# Patient Record
Sex: MALE | Race: BLACK OR AFRICAN AMERICAN | NOT HISPANIC OR LATINO | Employment: FULL TIME | URBAN - METROPOLITAN AREA
[De-identification: names, ages, dates, MRNs, and addresses within clinical notes are randomized per-mention and may not be internally consistent; named-entity substitution may affect disease eponyms.]

---

## 2020-03-27 ENCOUNTER — TELEPHONE (OUTPATIENT)
Dept: ORTHOPEDICS | Facility: CLINIC | Age: 40
End: 2020-03-27

## 2020-03-27 ENCOUNTER — OFFICE VISIT (OUTPATIENT)
Dept: ORTHOPEDICS | Facility: CLINIC | Age: 40
End: 2020-03-27
Payer: COMMERCIAL

## 2020-03-27 VITALS
HEIGHT: 69 IN | SYSTOLIC BLOOD PRESSURE: 140 MMHG | BODY MASS INDEX: 29.91 KG/M2 | OXYGEN SATURATION: 99 % | WEIGHT: 201.94 LBS | RESPIRATION RATE: 18 BRPM | HEART RATE: 76 BPM | DIASTOLIC BLOOD PRESSURE: 99 MMHG

## 2020-03-27 DIAGNOSIS — S46.211A RUPTURE OF RIGHT DISTAL BICEPS TENDON, INITIAL ENCOUNTER: Primary | ICD-10-CM

## 2020-03-27 PROCEDURE — 99999 PR PBB SHADOW E&M-EST. PATIENT-LVL III: CPT | Mod: PBBFAC,,, | Performed by: ORTHOPAEDIC SURGERY

## 2020-03-27 PROCEDURE — 99203 OFFICE O/P NEW LOW 30 MIN: CPT | Mod: S$GLB,,, | Performed by: ORTHOPAEDIC SURGERY

## 2020-03-27 PROCEDURE — 99999 PR PBB SHADOW E&M-EST. PATIENT-LVL III: ICD-10-PCS | Mod: PBBFAC,,, | Performed by: ORTHOPAEDIC SURGERY

## 2020-03-27 PROCEDURE — 99203 PR OFFICE/OUTPT VISIT, NEW, LEVL III, 30-44 MIN: ICD-10-PCS | Mod: S$GLB,,, | Performed by: ORTHOPAEDIC SURGERY

## 2020-03-27 RX ORDER — DICLOFENAC SODIUM 75 MG/1
75 TABLET, DELAYED RELEASE ORAL 2 TIMES DAILY
Qty: 42 TABLET | Refills: 1 | Status: SHIPPED | OUTPATIENT
Start: 2020-03-27 | End: 2020-04-02 | Stop reason: ALTCHOICE

## 2020-03-27 NOTE — PROGRESS NOTES
Subjective:    Patient ID:  Ryan Luna is a 39 y.o. y.o. male who presents for initial visit for Injury and Pain of the Right Elbow and Elbow Injury      38 yo male, RHD, reports acute onset right elbow pain after lifting a dresser yesterday. He was evaluated at [facility] [place] ED and x-rays of the right elbow were obtained and reported as negative for fracture. He was treated with a sling and is referred for orthopedic follow-up care.             History reviewed. No pertinent past medical history.     History reviewed. No pertinent surgical history.    Review of patient's allergies indicates:  No Known Allergies       Current Outpatient Medications:     diclofenac (VOLTAREN) 75 MG EC tablet, Take 1 tablet (75 mg total) by mouth 2 (two) times daily., Disp: 42 tablet, Rfl: 1    Social History     Socioeconomic History    Marital status:      Spouse name: Not on file    Number of children: Not on file    Years of education: Not on file    Highest education level: Not on file   Occupational History    Not on file   Social Needs    Financial resource strain: Not on file    Food insecurity:     Worry: Not on file     Inability: Not on file    Transportation needs:     Medical: Not on file     Non-medical: Not on file   Tobacco Use    Smoking status: Never Smoker   Substance and Sexual Activity    Alcohol use: Not on file    Drug use: Not on file    Sexual activity: Not on file   Lifestyle    Physical activity:     Days per week: Not on file     Minutes per session: Not on file    Stress: Not on file   Relationships    Social connections:     Talks on phone: Not on file     Gets together: Not on file     Attends Sikhism service: Not on file     Active member of club or organization: Not on file     Attends meetings of clubs or organizations: Not on file     Relationship status: Not on file   Other Topics Concern    Not on file   Social History Narrative    Not on file        History reviewed. No  "pertinent family history.     Review of Systems   Constitutional: Negative for chills and fever.   HENT: Negative for hearing loss.    Eyes: Negative for blurred vision.   Respiratory: Negative for shortness of breath.    Cardiovascular: Negative for chest pain.   Gastrointestinal: Negative for nausea and vomiting.   Genitourinary: Negative for dysuria.   Musculoskeletal: Negative for back pain.   Skin: Negative for itching.   Neurological: Negative for tingling and weakness.   Endo/Heme/Allergies: Does not bruise/bleed easily.   Psychiatric/Behavioral: Negative for depression.        Objective:     BP (!) 140/99 (BP Location: Left arm, Patient Position: Sitting, BP Method: Large (Manual))   Pulse 76   Resp 18   Ht 5' 9" (1.753 m)   Wt 91.6 kg (201 lb 15.1 oz)   SpO2 99%   BMI 29.82 kg/m²     Ortho Exam     Healthy appearing 40 yo male in NAD; alert, oriented x3    RUE: N/V intact except 4-4+/5 elbow supination    Right elbow: mild swell with ecchymosis/tenderness antecubital fossa; FROM; equivocal hook test    Imaging:     X-rays 3 view right elbow dated 3/26/2020 are independently reviewed by me and show no evidence of acute bony injury.      Assessment & Plan:      1. Rupture of right distal biceps tendon, initial encounter       1. Findings, diagnosis, treatment options/risks/benefits reviewed. Clinical suspicion for high-grade partial vs complete distal biceps tendon tear.  2. MRI without contrast right elbow  3. Continue sling support for comfort prn  4. Voltaren 75 mg BID (discontinue Toradol)  5. Local application ice 2-3x/day x additional 24-48 hours  6. Follow-up via phone contact (due to Covid-19) after MRI to review results  "

## 2020-04-01 ENCOUNTER — TELEPHONE (OUTPATIENT)
Dept: ORTHOPEDICS | Facility: CLINIC | Age: 40
End: 2020-04-01

## 2020-04-01 NOTE — H&P (VIEW-ONLY)
"CC: RIGHT distal biceps rupture, acute    39 y.o. RHD Male presents as a new patient to me. He works in shipping and packaging. Complaint is right elbow pain x 1 week. Reports he felt a pop when he was lifting a dresser and had immediate pain. The [facility] emergency room placed him in a sling and prescribed Voltaren BID which he reports resulted in significant pain relief.  Pain localizes anteriorly over the antecubital fossa. Worse with elbow active range of motion and trying to move or lift objects. Better with rest. Denies neck pain or radicular symptoms.  No numbness or tingling.  No medial or lateral elbow complaints.  No pre-existing problems.  Referred to me by my orthopedic colleague Dr. Cornell Abarca at [facility].    PMHx: None.  Negative for tobacco.   Negative for diabetes.    PAST MEDICAL HISTORY:   History reviewed. No pertinent past medical history.    PAST SURGICAL HISTORY:  History reviewed. No pertinent surgical history.    FAMILY HISTORY:  History reviewed. No pertinent family history.    MEDICATIONS:    Current Outpatient Medications:     diclofenac (VOLTAREN) 75 MG EC tablet, Take 1 tablet (75 mg total) by mouth 2 (two) times daily., Disp: 42 tablet, Rfl: 1    ALLERGIES:  Review of patient's allergies indicates:  No Known Allergies    REVIEW OF SYSTEMS:  Constitution: Negative. Negative for chills, fever and night sweats.    Hematologic/Lymphatic: Negative for bleeding problem. Does not bruise/bleed easily.   Skin: Negative for dry skin, itching and rash.   Musculoskeletal: Negative for falls. Positive for right elbow pain and muscle weakness.    All other review of symptoms were reviewed and found to be noncontributory.     PHYSICAL EXAMINATION:  Vitals:  BP (!) 157/114   Pulse 78   Ht 5' 9" (1.753 m)   Wt 91.2 kg (201 lb)   BMI 29.68 kg/m²    General: Well-developed well-nourished 39 y.o. malein no acute distress   Cardiovascular: Regular rhythm by palpation of distal pulse, normal color and " temperature, no concerning varicosities on symptomatic side   Lungs: No labored breathing or wheezing appreciated   Neuro: Alert and oriented ×3   Psychiatric: well oriented to person, place and time, demonstrates normal mood and affect   Skin: No rashes, lesions or ulcers, normal temperature, turgor, and texture on uninvolved extremity    Ortho/SPM Exam  Examination of the right elbow demonstrates a reverse juan sign with positive Hook test.  Weakness and pain with elbow flexion and forearm supination.  Nontender over the medial and lateral epicondyles.  Negative elbow flexion test for ulnar nerve complaints.  Full motor and sensory function to the right hand.  Good perfusion distally.  Intact distal triceps.    IMAGING:  Xrays including AP, Lateral and radial capitallar views of the right elbow are ordered / images reviewed by me:   No fracture or acute change appreciated.    MRI of Right elbow reviewed by me and discussed with patient. Study shows:    1. Full-thickness tear of the distal aspect of the biceps tendon with retraction of the tendon proximally as described above.   2. Small amount of fluid in the elbow joint.    ASSESSMENT:    No diagnosis found.    PLAN:     Findings and treatment options were discussed with the patient.  Discussed both operative and non operative options.  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  I would recommend surgery for this.  The details of surgery were discussed to include the expected postop rehab and recovery course.  He is 1 week out from his injury.  His wife is pregnant and is due in 7 days or so.  Surgery after this window will be difficult from that perspective.  He would like to proceed as soon as possible.  We will add him onto the schedule for tomorrow.     Plan is for a right elbow volar single incision open distal biceps repair.  I have discussed with him the risk for complications such as injury to the lateral antebrachial cutaneous nerve  and heterotopic ossification which is significantly less likely.  All other potential risks and complications were reviewed.  He wishes to proceed.      Noted preoperative blood pressure.  May be pain related.  We will get him into a primary care physician today for a preop screen, urgent given the circumstances.    All questions answered    Informed Consent:    The details of the surgical procedure were explained, including the location of probable incisions and a description of possible hardware and/or grafts to be used. Alternatives to both operative and non-operative options with associated risks and benefits were discussed. The patient understands the likely convalescence after surgery and, in particular, the expected postop rehab and recovery course. The outlined risks and potential complications of the proposed procedure include but are not limited to: infection, poor wound healing, scarring, deformity, stiffness, swelling, continued or recurrent pain, instability, hardware or prosthetic failure if implanted, symptomatic hardware requiring removal, weakness, neurovascular injury, numbness, chronic regional pain disorder, tissue nonhealing/irreparability/retear, subsequent contralateral limb injury or pathology, chondral injury, arthritis, fracture, blood clot formation, inability to return to previous level of activity, anesthetic or regional block complication up to death, need for additional procedure as indicated intraoperatively, and potential need for further surgery.    The patient was also informed and understands that the risks of surgery are greater for patients with a current condition or history of heart disease, obesity, clotting disorders, recurrent infections, steroid use, current or past smoking, and factors such as sedentary lifestyle and noncompliance with medications, therapy or follow-up. The degree of the increased risk is hard to estimate with any degree of precision. If applicable,  smoking cessation was discussed.     All questions were answered. The patient has verbalized understanding of these issues and wishes to proceed with the surgery as discussed.      Procedures

## 2020-04-01 NOTE — PROGRESS NOTES
"CC: RIGHT distal biceps rupture, acute    39 y.o. RHD Male presents as a new patient to me. He works in shipping and packaging. Complaint is right elbow pain x 1 week. Reports he felt a pop when he was lifting a dresser and had immediate pain. The [facility] emergency room placed him in a sling and prescribed Voltaren BID which he reports resulted in significant pain relief.  Pain localizes anteriorly over the antecubital fossa. Worse with elbow active range of motion and trying to move or lift objects. Better with rest. Denies neck pain or radicular symptoms.  No numbness or tingling.  No medial or lateral elbow complaints.  No pre-existing problems.  Referred to me by my orthopedic colleague Dr. Cornell Abarca at [facility].    PMHx: None.  Negative for tobacco.   Negative for diabetes.    PAST MEDICAL HISTORY:   History reviewed. No pertinent past medical history.    PAST SURGICAL HISTORY:  History reviewed. No pertinent surgical history.    FAMILY HISTORY:  History reviewed. No pertinent family history.    MEDICATIONS:    Current Outpatient Medications:     diclofenac (VOLTAREN) 75 MG EC tablet, Take 1 tablet (75 mg total) by mouth 2 (two) times daily., Disp: 42 tablet, Rfl: 1    ALLERGIES:  Review of patient's allergies indicates:  No Known Allergies    REVIEW OF SYSTEMS:  Constitution: Negative. Negative for chills, fever and night sweats.    Hematologic/Lymphatic: Negative for bleeding problem. Does not bruise/bleed easily.   Skin: Negative for dry skin, itching and rash.   Musculoskeletal: Negative for falls. Positive for right elbow pain and muscle weakness.    All other review of symptoms were reviewed and found to be noncontributory.     PHYSICAL EXAMINATION:  Vitals:  BP (!) 157/114   Pulse 78   Ht 5' 9" (1.753 m)   Wt 91.2 kg (201 lb)   BMI 29.68 kg/m²    General: Well-developed well-nourished 39 y.o. malein no acute distress   Cardiovascular: Regular rhythm by palpation of distal pulse, normal color and " temperature, no concerning varicosities on symptomatic side   Lungs: No labored breathing or wheezing appreciated   Neuro: Alert and oriented ×3   Psychiatric: well oriented to person, place and time, demonstrates normal mood and affect   Skin: No rashes, lesions or ulcers, normal temperature, turgor, and texture on uninvolved extremity    Ortho/SPM Exam  Examination of the right elbow demonstrates a reverse juan sign with positive Hook test.  Weakness and pain with elbow flexion and forearm supination.  Nontender over the medial and lateral epicondyles.  Negative elbow flexion test for ulnar nerve complaints.  Full motor and sensory function to the right hand.  Good perfusion distally.  Intact distal triceps.    IMAGING:  Xrays including AP, Lateral and radial capitallar views of the right elbow are ordered / images reviewed by me:   No fracture or acute change appreciated.    MRI of Right elbow reviewed by me and discussed with patient. Study shows:    1. Full-thickness tear of the distal aspect of the biceps tendon with retraction of the tendon proximally as described above.   2. Small amount of fluid in the elbow joint.    ASSESSMENT:    No diagnosis found.    PLAN:     Findings and treatment options were discussed with the patient.  Discussed both operative and non operative options.  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  I would recommend surgery for this.  The details of surgery were discussed to include the expected postop rehab and recovery course.  He is 1 week out from his injury.  His wife is pregnant and is due in 7 days or so.  Surgery after this window will be difficult from that perspective.  He would like to proceed as soon as possible.  We will add him onto the schedule for tomorrow.     Plan is for a right elbow volar single incision open distal biceps repair.  I have discussed with him the risk for complications such as injury to the lateral antebrachial cutaneous nerve  and heterotopic ossification which is significantly less likely.  All other potential risks and complications were reviewed.  He wishes to proceed.      Noted preoperative blood pressure.  May be pain related.  We will get him into a primary care physician today for a preop screen, urgent given the circumstances.    All questions answered    Informed Consent:    The details of the surgical procedure were explained, including the location of probable incisions and a description of possible hardware and/or grafts to be used. Alternatives to both operative and non-operative options with associated risks and benefits were discussed. The patient understands the likely convalescence after surgery and, in particular, the expected postop rehab and recovery course. The outlined risks and potential complications of the proposed procedure include but are not limited to: infection, poor wound healing, scarring, deformity, stiffness, swelling, continued or recurrent pain, instability, hardware or prosthetic failure if implanted, symptomatic hardware requiring removal, weakness, neurovascular injury, numbness, chronic regional pain disorder, tissue nonhealing/irreparability/retear, subsequent contralateral limb injury or pathology, chondral injury, arthritis, fracture, blood clot formation, inability to return to previous level of activity, anesthetic or regional block complication up to death, need for additional procedure as indicated intraoperatively, and potential need for further surgery.    The patient was also informed and understands that the risks of surgery are greater for patients with a current condition or history of heart disease, obesity, clotting disorders, recurrent infections, steroid use, current or past smoking, and factors such as sedentary lifestyle and noncompliance with medications, therapy or follow-up. The degree of the increased risk is hard to estimate with any degree of precision. If applicable,  smoking cessation was discussed.     All questions were answered. The patient has verbalized understanding of these issues and wishes to proceed with the surgery as discussed.      Procedures

## 2020-04-01 NOTE — TELEPHONE ENCOUNTER
Spoke with patient today re: right elbow MRI results. MRI confirms a full-thickness tear of the distal biceps tendon. Treatment options were discussed and patient is interested in proceeding with surgical repair. I have contacted my sports medicine colleague, Dr. Ward, who has agreed to assume further care of the patient for this injury.

## 2020-04-02 ENCOUNTER — PATIENT MESSAGE (OUTPATIENT)
Dept: SURGERY | Facility: HOSPITAL | Age: 40
End: 2020-04-02

## 2020-04-02 ENCOUNTER — OFFICE VISIT (OUTPATIENT)
Dept: SPORTS MEDICINE | Facility: CLINIC | Age: 40
End: 2020-04-02
Payer: COMMERCIAL

## 2020-04-02 ENCOUNTER — OFFICE VISIT (OUTPATIENT)
Dept: INTERNAL MEDICINE | Facility: CLINIC | Age: 40
End: 2020-04-02
Payer: COMMERCIAL

## 2020-04-02 ENCOUNTER — PATIENT MESSAGE (OUTPATIENT)
Dept: INTERNAL MEDICINE | Facility: CLINIC | Age: 40
End: 2020-04-02

## 2020-04-02 ENCOUNTER — ANESTHESIA EVENT (OUTPATIENT)
Dept: SURGERY | Facility: HOSPITAL | Age: 40
End: 2020-04-02
Payer: COMMERCIAL

## 2020-04-02 ENCOUNTER — LAB VISIT (OUTPATIENT)
Dept: LAB | Facility: HOSPITAL | Age: 40
End: 2020-04-02
Attending: FAMILY MEDICINE
Payer: COMMERCIAL

## 2020-04-02 ENCOUNTER — PATIENT MESSAGE (OUTPATIENT)
Dept: ORTHOPEDICS | Facility: CLINIC | Age: 40
End: 2020-04-02

## 2020-04-02 ENCOUNTER — TELEPHONE (OUTPATIENT)
Dept: SPORTS MEDICINE | Facility: CLINIC | Age: 40
End: 2020-04-02

## 2020-04-02 VITALS
HEIGHT: 69 IN | HEART RATE: 85 BPM | BODY MASS INDEX: 29.88 KG/M2 | WEIGHT: 201.75 LBS | SYSTOLIC BLOOD PRESSURE: 170 MMHG | OXYGEN SATURATION: 98 % | DIASTOLIC BLOOD PRESSURE: 110 MMHG

## 2020-04-02 VITALS
DIASTOLIC BLOOD PRESSURE: 113 MMHG | SYSTOLIC BLOOD PRESSURE: 180 MMHG | HEIGHT: 69 IN | WEIGHT: 201 LBS | BODY MASS INDEX: 29.77 KG/M2 | HEART RATE: 75 BPM

## 2020-04-02 DIAGNOSIS — S46.211A RUPTURE OF RIGHT DISTAL BICEPS TENDON, INITIAL ENCOUNTER: Primary | ICD-10-CM

## 2020-04-02 DIAGNOSIS — Z01.818 PREOP EXAMINATION: Primary | ICD-10-CM

## 2020-04-02 DIAGNOSIS — S46.219A BICEPS TENDON TEAR: ICD-10-CM

## 2020-04-02 DIAGNOSIS — Z01.818 PREOP EXAMINATION: ICD-10-CM

## 2020-04-02 DIAGNOSIS — S46.211A RUPTURE OF RIGHT DISTAL BICEPS TENDON, INITIAL ENCOUNTER: ICD-10-CM

## 2020-04-02 DIAGNOSIS — I10 HYPERTENSION, ESSENTIAL: Primary | ICD-10-CM

## 2020-04-02 LAB
ALBUMIN SERPL BCP-MCNC: 4.1 G/DL (ref 3.5–5.2)
ALP SERPL-CCNC: 86 U/L (ref 55–135)
ALT SERPL W/O P-5'-P-CCNC: 33 U/L (ref 10–44)
ANION GAP SERPL CALC-SCNC: 7 MMOL/L (ref 8–16)
ANION GAP SERPL CALC-SCNC: 7 MMOL/L (ref 8–16)
AST SERPL-CCNC: 23 U/L (ref 10–40)
BILIRUB SERPL-MCNC: 0.9 MG/DL (ref 0.1–1)
BUN SERPL-MCNC: 8 MG/DL (ref 6–20)
BUN SERPL-MCNC: 8 MG/DL (ref 6–20)
CALCIUM SERPL-MCNC: 9.9 MG/DL (ref 8.7–10.5)
CALCIUM SERPL-MCNC: 9.9 MG/DL (ref 8.7–10.5)
CHLORIDE SERPL-SCNC: 104 MMOL/L (ref 95–110)
CHLORIDE SERPL-SCNC: 104 MMOL/L (ref 95–110)
CO2 SERPL-SCNC: 28 MMOL/L (ref 23–29)
CO2 SERPL-SCNC: 28 MMOL/L (ref 23–29)
CREAT SERPL-MCNC: 1.2 MG/DL (ref 0.5–1.4)
CREAT SERPL-MCNC: 1.2 MG/DL (ref 0.5–1.4)
EST. GFR  (AFRICAN AMERICAN): >60 ML/MIN/1.73 M^2
EST. GFR  (AFRICAN AMERICAN): >60 ML/MIN/1.73 M^2
EST. GFR  (NON AFRICAN AMERICAN): >60 ML/MIN/1.73 M^2
EST. GFR  (NON AFRICAN AMERICAN): >60 ML/MIN/1.73 M^2
GLUCOSE SERPL-MCNC: 148 MG/DL (ref 70–110)
GLUCOSE SERPL-MCNC: 148 MG/DL (ref 70–110)
POTASSIUM SERPL-SCNC: 4.3 MMOL/L (ref 3.5–5.1)
POTASSIUM SERPL-SCNC: 4.3 MMOL/L (ref 3.5–5.1)
PROT SERPL-MCNC: 8.2 G/DL (ref 6–8.4)
SODIUM SERPL-SCNC: 139 MMOL/L (ref 136–145)
SODIUM SERPL-SCNC: 139 MMOL/L (ref 136–145)

## 2020-04-02 PROCEDURE — 99999 PR PBB SHADOW E&M-EST. PATIENT-LVL III: CPT | Mod: PBBFAC,,, | Performed by: FAMILY MEDICINE

## 2020-04-02 PROCEDURE — 99204 OFFICE O/P NEW MOD 45 MIN: CPT | Mod: S$GLB,,, | Performed by: ORTHOPAEDIC SURGERY

## 2020-04-02 PROCEDURE — 99204 PR OFFICE/OUTPT VISIT, NEW, LEVL IV, 45-59 MIN: ICD-10-PCS | Mod: S$GLB,,, | Performed by: ORTHOPAEDIC SURGERY

## 2020-04-02 PROCEDURE — 93010 EKG 12-LEAD: ICD-10-PCS | Mod: S$GLB,,, | Performed by: INTERNAL MEDICINE

## 2020-04-02 PROCEDURE — 36415 COLL VENOUS BLD VENIPUNCTURE: CPT

## 2020-04-02 PROCEDURE — 99999 PR PBB SHADOW E&M-EST. PATIENT-LVL III: ICD-10-PCS | Mod: PBBFAC,,, | Performed by: FAMILY MEDICINE

## 2020-04-02 PROCEDURE — 93010 ELECTROCARDIOGRAM REPORT: CPT | Mod: S$GLB,,, | Performed by: INTERNAL MEDICINE

## 2020-04-02 PROCEDURE — 99203 OFFICE O/P NEW LOW 30 MIN: CPT | Mod: S$GLB,,, | Performed by: FAMILY MEDICINE

## 2020-04-02 PROCEDURE — 99999 PR PBB SHADOW E&M-EST. PATIENT-LVL III: CPT | Mod: PBBFAC,,, | Performed by: ORTHOPAEDIC SURGERY

## 2020-04-02 PROCEDURE — 80053 COMPREHEN METABOLIC PANEL: CPT

## 2020-04-02 PROCEDURE — 99203 PR OFFICE/OUTPT VISIT, NEW, LEVL III, 30-44 MIN: ICD-10-PCS | Mod: S$GLB,,, | Performed by: FAMILY MEDICINE

## 2020-04-02 PROCEDURE — 99999 PR PBB SHADOW E&M-EST. PATIENT-LVL III: ICD-10-PCS | Mod: PBBFAC,,, | Performed by: ORTHOPAEDIC SURGERY

## 2020-04-02 RX ORDER — SODIUM CHLORIDE 0.9 % (FLUSH) 0.9 %
10 SYRINGE (ML) INJECTION
Status: CANCELLED | OUTPATIENT
Start: 2020-04-02

## 2020-04-02 RX ORDER — ONDANSETRON 4 MG/1
4 TABLET, FILM COATED ORAL EVERY 8 HOURS PRN
Qty: 30 TABLET | Refills: 0 | Status: SHIPPED | OUTPATIENT
Start: 2020-04-02 | End: 2022-10-07

## 2020-04-02 RX ORDER — LOSARTAN POTASSIUM 50 MG/1
50 TABLET ORAL DAILY
Qty: 90 TABLET | Refills: 3 | Status: SHIPPED | OUTPATIENT
Start: 2020-04-02 | End: 2021-03-22

## 2020-04-02 RX ORDER — OXYCODONE HYDROCHLORIDE 5 MG/1
TABLET ORAL
Qty: 28 TABLET | Refills: 0 | Status: SHIPPED | OUTPATIENT
Start: 2020-04-02 | End: 2020-04-16

## 2020-04-02 RX ORDER — MUPIROCIN 20 MG/G
OINTMENT TOPICAL
Status: CANCELLED | OUTPATIENT
Start: 2020-04-02

## 2020-04-02 RX ORDER — NAPROXEN 375 MG/1
375 TABLET ORAL EVERY 12 HOURS
Qty: 42 TABLET | Refills: 0 | Status: SHIPPED | OUTPATIENT
Start: 2020-04-02 | End: 2020-04-23

## 2020-04-02 RX ORDER — OXYCODONE HYDROCHLORIDE 5 MG/1
TABLET ORAL
Qty: 28 TABLET | Refills: 0 | Status: SHIPPED | OUTPATIENT
Start: 2020-04-02 | End: 2020-04-02 | Stop reason: ALTCHOICE

## 2020-04-02 NOTE — ANESTHESIA PAT ROS NOTE
04/02/2020  Ryan Luna is a 39 y.o., male.      Pre-op Assessment    I have reviewed the Patient Summary Reports.     I have reviewed the Nursing Notes.      Review of Systems  Anesthesia Hx:  No problems with previous Anesthesia    Social:  Non-Smoker, No Alcohol Use    Hematology/Oncology:  Hematology Normal   Oncology Normal     EENT/Dental:   Denies Active Dental Problems   Cardiovascular:   Exercise tolerance: good  Denies Cardiomyopathy   Denies Peripheral Arterial Disease.   Denies Vasculitis   Pulmonary:  Denies Pulmonary Symptoms.  Denies Asthma.  Denies Pulmonary Fibrosis/Interstitial Disease.  Denies Pulmonary Infection.    Renal/:  Renal/ Normal   Denies Renal Symptoms/Infections/Stones    Hepatic/GI:  Denies Hepatic/GI Symptoms  Denies Liver Disease    Musculoskeletal:  Musculoskeletal Normal  Denies Musculoskeletal General/Symptoms    Neurological:  Neurology Normal   no Neuro Symptoms Denies Seizure Disorder  Denies Head Injury    Endocrine:  Denies Diabetes  Denies Thyroid Disease   Denies Pituitary Disease    Dermatological:  Skin Normal  Denies Skin Symptoms/Problems   Psych:  Psychiatric Normal              Anesthesia Assessment: Preoperative EQUATION    Planned Procedure: Procedure(s) (LRB):  REPAIR, TENDON, ARTHROSCOPIC, WITH CONVERSION TO OPEN TENDON REPAIR IF INDICATED (Right)  Requested Anesthesia Type:General  Surgeon: CATHY Ward MD  Service: Orthopedics  Known or anticipated Date of Surgery:4/3/2020    Surgeon notes: History reviewed. No pertinent surgical history.    Electronic QUestionnaire Assessment completed via nurse interview with patient.        Triage considerations:     The patient has no apparent active cardiac condition (No unstable coronary Syndrome such as severe unstable angina or recent [<1 month] myocardial infarction, decompensated CHF, severe  valvular   disease or significant arrhythmia)    Previous anesthesia records:{prev anes:77147}    Last PCP note: {PCP note age:08195}  Subspecialty notes: {subspecialty:69629}    Other important co-morbidities: {TRIAGE COMORBIDITIES:75408}      Tests already available:  {testin}             Instructions given. (See in Nurse's note)    Optimization:  Anesthesia Preop Clinic Assessment  Indicated    Medical Opinion Indicated       Sub-specialist consult indicated:   TBD       Plan:    Testing:  See Orders.   Pre-anesthesia  visit       Visit focus: to be determined     Consultation:to be determined     Patient  has previously scheduled Medical Appointment:    Navigation: Tests Scheduled.              Consults scheduled.             Results will be tracked by Preop Clinic.

## 2020-04-02 NOTE — ANESTHESIA PREPROCEDURE EVALUATION
04/02/2020  Ryan Luna is a 39 y.o., male   Pre-operative evaluation for Procedure(s) (LRB):  REPAIR, TENDON, ARTHROSCOPIC, WITH CONVERSION TO OPEN TENDON REPAIR IF INDICATED (Right)    Ryan Luna is a 39 y.o. male       Active problems:  There is no problem list on file for this patient.      Prev airway:       Review of patient's allergies indicates:  No Known Allergies     No current facility-administered medications on file prior to encounter.      Current Outpatient Medications on File Prior to Encounter   Medication Sig Dispense Refill    diclofenac (VOLTAREN) 75 MG EC tablet Take 1 tablet (75 mg total) by mouth 2 (two) times daily. 42 tablet 1    naproxen (EC-NAPROSYN) 375 MG TbEC EC tablet Take 1 tablet (375 mg total) by mouth every 12 (twelve) hours. for 21 days 42 tablet 0    ondansetron (ZOFRAN) 4 MG tablet Take 1 tablet (4 mg total) by mouth every 8 (eight) hours as needed for Nausea. 30 tablet 0    oxyCODONE (ROXICODONE) 5 MG immediate release tablet Take 1-2 tablets as needed for pain every 6 hours. 28 tablet 0       History reviewed. No pertinent surgical history.    Social History     Socioeconomic History    Marital status:      Spouse name: Not on file    Number of children: Not on file    Years of education: Not on file    Highest education level: Not on file   Occupational History    Not on file   Social Needs    Financial resource strain: Not hard at all    Food insecurity:     Worry: Never true     Inability: Never true    Transportation needs:     Medical: No     Non-medical: No   Tobacco Use    Smoking status: Never Smoker   Substance and Sexual Activity    Alcohol use: Not on file    Drug use: Not on file    Sexual activity: Not on file   Lifestyle    Physical activity:     Days per week: 0 days     Minutes per session: 0 min    Stress: Not at all    Relationships    Social connections:     Talks on phone: More than three times a week     Gets together: Twice a week     Attends Nondenominational service: Not on file     Active member of club or organization: No     Attends meetings of clubs or organizations: Never     Relationship status:    Other Topics Concern    Not on file   Social History Narrative    Not on file         Vital Signs Range (Last 24H):  Wt Readings from Last 3 Encounters:   04/02/20 91.5 kg (201 lb 11.5 oz)   04/02/20 91.2 kg (201 lb)   03/27/20 91.6 kg (201 lb 15.1 oz)     Temp Readings from Last 3 Encounters:   03/26/20 37.1 °C (98.8 °F) (Temporal)     BP Readings from Last 3 Encounters:   04/02/20 (!) 160/82   04/02/20 (!) 180/113   03/27/20 (!) 140/99     Pulse Readings from Last 3 Encounters:   04/02/20 85   04/02/20 75   03/27/20 76         CBC: No results found for: WBC, HGB, HCT, MCV, PLT    CMP: CMP  Sodium   Date Value Ref Range Status   04/02/2020 139 136 - 145 mmol/L Final   04/02/2020 139 136 - 145 mmol/L Final     Potassium   Date Value Ref Range Status   04/02/2020 4.3 3.5 - 5.1 mmol/L Final   04/02/2020 4.3 3.5 - 5.1 mmol/L Final     Chloride   Date Value Ref Range Status   04/02/2020 104 95 - 110 mmol/L Final   04/02/2020 104 95 - 110 mmol/L Final     CO2   Date Value Ref Range Status   04/02/2020 28 23 - 29 mmol/L Final   04/02/2020 28 23 - 29 mmol/L Final     Glucose   Date Value Ref Range Status   04/02/2020 148 (H) 70 - 110 mg/dL Final   04/02/2020 148 (H) 70 - 110 mg/dL Final     BUN, Bld   Date Value Ref Range Status   04/02/2020 8 6 - 20 mg/dL Final   04/02/2020 8 6 - 20 mg/dL Final     Creatinine   Date Value Ref Range Status   04/02/2020 1.2 0.5 - 1.4 mg/dL Final   04/02/2020 1.2 0.5 - 1.4 mg/dL Final     Calcium   Date Value Ref Range Status   04/02/2020 9.9 8.7 - 10.5 mg/dL Final   04/02/2020 9.9 8.7 - 10.5 mg/dL Final     Total Protein   Date Value Ref Range Status   04/02/2020 8.2 6.0 - 8.4 g/dL Final      Albumin   Date Value Ref Range Status   04/02/2020 4.1 3.5 - 5.2 g/dL Final     Total Bilirubin   Date Value Ref Range Status   04/02/2020 0.9 0.1 - 1.0 mg/dL Final     Comment:     For infants and newborns, interpretation of results should be based  on gestational age, weight and in agreement with clinical  observations.  Premature Infant recommended reference ranges:  Up to 24 hours.............<8.0 mg/dL  Up to 48 hours............<12.0 mg/dL  3-5 days..................<15.0 mg/dL  6-29 days.................<15.0 mg/dL       Alkaline Phosphatase   Date Value Ref Range Status   04/02/2020 86 55 - 135 U/L Final     AST   Date Value Ref Range Status   04/02/2020 23 10 - 40 U/L Final     ALT   Date Value Ref Range Status   04/02/2020 33 10 - 44 U/L Final     Anion Gap   Date Value Ref Range Status   04/02/2020 7 (L) 8 - 16 mmol/L Final   04/02/2020 7 (L) 8 - 16 mmol/L Final     eGFR if    Date Value Ref Range Status   04/02/2020 >60.0 >60 mL/min/1.73 m^2 Final   04/02/2020 >60.0 >60 mL/min/1.73 m^2 Final     eGFR if non    Date Value Ref Range Status   04/02/2020 >60.0 >60 mL/min/1.73 m^2 Final     Comment:     Calculation used to obtain the estimated glomerular filtration  rate (eGFR) is the CKD-EPI equation.      04/02/2020 >60.0 >60 mL/min/1.73 m^2 Final     Comment:     Calculation used to obtain the estimated glomerular filtration  rate (eGFR) is the CKD-EPI equation.          INR  No results found for: INR, PROTIME        Diagnostic Studies:      EKG:    Vent. Rate : 077 BPM     Atrial Rate : 077 BPM     P-R Int : 180 ms          QRS Dur : 080 ms      QT Int : 352 ms       P-R-T Axes : 053 006 052 degrees     QTc Int : 398 ms    Normal sinus rhythm  Possible Left atrial enlargement  Borderline Abnormal ECG  No previous ECGs available  Confirmed by XAVIER MOODY MD ([id number]) on 4/2/2020 3:14:49 PM    Referred By:  REGULO ARZOLA           Confirmed By:XAVIER MOODY MD    2D  Echo:        .    Anesthesia Evaluation    I have reviewed the Patient Summary Reports.    I have reviewed the Nursing Notes.   I have reviewed the Medications.     Review of Systems  Anesthesia Hx:  No problems with previous Anesthesia  Denies Family Hx of Anesthesia complications.   Denies Personal Hx of Anesthesia complications.   Social:  Non-Smoker, No Alcohol Use    EENT/Dental:EENT/Dental Normal   Cardiovascular:   Exercise tolerance: good Hypertension New diagnosis of HTN (yesterday ); losartan called in yesterday    Pulmonary:  Pulmonary Normal    Hepatic/GI:  Hepatic/GI Normal    Neurological:  Neurology Normal    Endocrine:  Endocrine Normal        Physical Exam  General:  Well nourished    Airway/Jaw/Neck:  Airway Findings: Mouth Opening: Normal Tongue: Normal  General Airway Assessment: Adult  Mallampati: I  Improves to I with phonation.  TM Distance: Normal, at least 6 cm  Jaw/Neck Findings:  Neck ROM: Normal ROM      Dental:  Dental Findings: In tact    Chest/Lungs:  Chest/Lungs Findings: Clear to auscultation     Heart/Vascular:  Heart Findings: Rate: Normal  Rhythm: Regular Rhythm  Sounds: Normal        Mental Status:  Mental Status Findings:  Cooperative         Anesthesia Plan  Type of Anesthesia, risks & benefits discussed:  Anesthesia Type:  general  Patient's Preference:   Intra-op Monitoring Plan:   Intra-op Monitoring Plan Comments:   Post Op Pain Control Plan: multimodal analgesia  Post Op Pain Control Plan Comments:   Induction:   IV  Beta Blocker:  Patient is not currently on a Beta-Blocker (No further documentation required).       Informed Consent: Patient understands risks and agrees with Anesthesia plan.  Questions answered. Anesthesia consent signed with patient.  ASA Score: 2     Day of Surgery Review of History & Physical:    H&P update referred to the surgeon.         Ready For Surgery From Anesthesia Perspective.

## 2020-04-02 NOTE — PROGRESS NOTES
Subjective:       Patient ID: Ryan Luna is a 39 y.o. male.    Chief Complaint:   Pre-op Exam    Preoperative evaluation  Has full-thickness tear of R biceps tendon which occurred 326.  Has surgery scheduled for tomorrow.  His wife is pregnant and due in 7 days.  He'd like surgery as soon as possible before his wife delivers.  Saw Dr. Ward this morning for preop visit, who referred him to me for a preop screen.  Had non-fasting CMP this morning which was normal.      Hypertension   This is a new problem. Episode onset: 3/26/2020 at the ER.  He never checks his blood pressure.  He states his pain is minimal today.  Only hurts when he moves his arm. The problem is unchanged. The problem is uncontrolled. Pertinent negatives include no anxiety, chest pain, headaches, neck pain, palpitations or shortness of breath. There are no associated agents to hypertension.     Review of Systems   Constitutional: Positive for activity change. Negative for unexpected weight change.   HENT: Negative for hearing loss, rhinorrhea and trouble swallowing.    Eyes: Negative for discharge and visual disturbance.   Respiratory: Negative for chest tightness, shortness of breath and wheezing.    Cardiovascular: Negative for chest pain and palpitations.   Gastrointestinal: Negative for blood in stool, constipation, diarrhea and vomiting.   Endocrine: Negative for polydipsia and polyuria.   Genitourinary: Negative for difficulty urinating, hematuria and urgency.   Musculoskeletal: Positive for arthralgias and joint swelling. Negative for neck pain.   Neurological: Negative for weakness and headaches.   Psychiatric/Behavioral: Negative for confusion and dysphoric mood.     Current Outpatient Medications   Medication Sig    naproxen (EC-NAPROSYN) 375 MG TbEC EC tablet Take 1 tablet (375 mg total) by mouth every 12 (twelve) hours. for 21 days    ondansetron (ZOFRAN) 4 MG tablet Take 1 tablet (4 mg total) by mouth every 8 (eight) hours as  "needed for Nausea.     No current facility-administered medications for this visit.      History reviewed. No pertinent past medical history.  History reviewed. No pertinent family history.  Social History     Tobacco Use    Smoking status: Never Smoker   Substance Use Topics    Alcohol use: Not on file    Drug use: Not on file       Objective:      Vitals:    04/02/20 1420 04/02/20 1442   BP: (!) 160/82 (!) 170/110   BP Location: Right arm    Patient Position: Sitting    BP Method: Large (Manual)    Pulse: 85    SpO2: 98%    Weight: 91.5 kg (201 lb 11.5 oz)    Height: 5' 9" (1.753 m)      Physical Exam   Constitutional: He is oriented to person, place, and time. He appears well-developed and well-nourished. No distress.   HENT:   Head: Normocephalic and atraumatic.   Mouth/Throat: Oropharynx is clear and moist.   Eyes: Pupils are equal, round, and reactive to light. Conjunctivae and EOM are normal.   Neck: Normal range of motion. Neck supple.   Cardiovascular: Normal rate, regular rhythm and normal heart sounds. Exam reveals no gallop and no friction rub.   No murmur heard.  Pulmonary/Chest: Effort normal and breath sounds normal. He has no wheezes. He has no rales.   Musculoskeletal: He exhibits no deformity.   Neurological: He is alert and oriented to person, place, and time.   Skin: Skin is warm and dry. He is not diaphoretic.   Psychiatric: He has a normal mood and affect. His behavior is normal.   Nursing note and vitals reviewed.           Assessment and Plan:     Hypertension, essential  Comments:  Lifestyle changes discussed.  Orders:  -     EKG 12-lead  -     [facility] Patient Entered Blood Pressure  -     losartan (COZAAR) 50 MG tablet; Take 1 tablet (50 mg total) by mouth once daily.  Dispense: 90 tablet; Refill: 3    Biceps tendon tear    Preop examination    He has a normal CMP and EKG.  He has no chronic medical issues and no previous diagnosis of HTN.  He is cleared for surgery tomorrow.  He is to " take  losartan this afternoon and take the first dose.  He has been instructed not to take it tomorrow before his surgery.     Follow up in about 1 month (around 5/2/2020) for HTN.      Bernardo Bloom MD

## 2020-04-02 NOTE — LETTER
April 2, 2020      Cornell Brock MD  [address]  [address]  [facility] [address]           [address]  [facility] [address]  Phone: [phone]          Patient: Ryan Luna   MR Number: [id number]   YOB: 1980   Date of Visit: 4/2/2020       Dear Dr. Cornell Brock:    Thank you for referring Ryan Luna to me for evaluation. Attached you will find relevant portions of my assessment and plan of care.    If you have questions, please do not hesitate to call me. I look forward to following Ryan Luna along with you.    Sincerely,    W Matthew Ward MD    Enclosure  CC:  No Recipients    If you would like to receive this communication electronically, please contact [email or web address] or [phone] to request more information on [employer] Link access.    For providers and/or their staff who would like to refer a patient to [facility], please contact us through our one-stop-shop provider referral line, [facility], at [phone].    If you feel you have received this communication in error or would no longer like to receive these types of communications, please e-mail [email or web address]

## 2020-04-02 NOTE — H&P
Ryan Luna  is here for a completion of his perioperative paperwork. he  Is scheduled to undergo right distal biceps repair as indicated on 4/3/2020.  He is a healthy individual and does not need clearance for this procedure.     Risks, indications and benefits of the surgical procedure were discussed with the patient. All questions with regard to surgery, rehab, expected return to functional activities, activities of daily living and recreational endeavors were answered to his satisfaction.    Patient was informed and understands the risks of surgery are greater for patients with a current condition or hx of heart disease, obesity, clotting disorders, recurrent infections, steroid use, current or past smoking, and factors such as sedentary lifestyle and noncompliance with medications, therapy or f/u. The degree of the increased risk is hard to estimate w/ any degree of precision.    Once no other questions were asked, a brief history and physical exam was then performed.    PAST MEDICAL HISTORY: History reviewed. No pertinent past medical history.  PAST SURGICAL HISTORY: History reviewed. No pertinent surgical history.  FAMILY HISTORY: History reviewed. No pertinent family history.  SOCIAL HISTORY:   Social History     Socioeconomic History    Marital status:      Spouse name: Not on file    Number of children: Not on file    Years of education: Not on file    Highest education level: Not on file   Occupational History    Not on file   Social Needs    Financial resource strain: Not on file    Food insecurity:     Worry: Not on file     Inability: Not on file    Transportation needs:     Medical: Not on file     Non-medical: Not on file   Tobacco Use    Smoking status: Never Smoker   Substance and Sexual Activity    Alcohol use: Not on file    Drug use: Not on file    Sexual activity: Not on file   Lifestyle    Physical activity:     Days per week: Not on file     Minutes per session: Not on  file    Stress: Not on file   Relationships    Social connections:     Talks on phone: Not on file     Gets together: Not on file     Attends [employer] service: Not on file     Active member of club or organization: Not on file     Attends meetings of clubs or organizations: Not on file     Relationship status: Not on file   Other Topics Concern    Not on file   Social History Narrative    Not on file       MEDICATIONS:   Current Outpatient Medications:     diclofenac (VOLTAREN) 75 MG EC tablet, Take 1 tablet (75 mg total) by mouth 2 (two) times daily., Disp: 42 tablet, Rfl: 1  ALLERGIES: Review of patient's allergies indicates:  No Known Allergies    Review of Systems   Constitution: Negative. Negative for chills, fever and night sweats.   HENT: Negative for congestion and headaches.    Eyes: Negative for blurred vision, left vision loss and right vision loss.   Cardiovascular: Negative for chest pain and syncope.   Respiratory: Negative for cough and shortness of breath.    Endocrine: Negative for polydipsia, polyphagia and polyuria.   Hematologic/Lymphatic: Negative for bleeding problem. Does not bruise/bleed easily.   Skin: Negative for dry skin, itching and rash.   Musculoskeletal: Negative for falls and muscle weakness.   Gastrointestinal: Negative for abdominal pain and bowel incontinence.   Genitourinary: Negative for bladder incontinence and nocturia.   Neurological: Negative for disturbances in coordination, loss of balance and seizures.   Psychiatric/Behavioral: Negative for depression. The patient does not have insomnia.    Allergic/Immunologic: Negative for hives and persistent infections.     PHYSICAL EXAM:  GEN: A&Ox3, WD WN NAD  HEENT: WNL  CHEST: CTAB, no W/R/R  HEART: RRR, no M/R/G   ABD: Soft, NT ND, BS x4 QUADS  MS: Refer to previous note for detailed MS exam  NEURO: CN II-XII intact       The surgical consent was then reviewed with the patient, who agreed with all the contents of the  consent form and it was signed.     PHYSICAL THERAPY:  He was also instructed regarding physical therapy and will begin on POD#1-3. He is doing physical therapy at [facility].    POST OP CARE: Instructions were reviewed including care of the wound and dressing after surgery and when he can shower.     PAIN MANAGEMENT: Ryan Luna was instructed regarding the Polar ice unit that will be in place after surgery and his postoperative pain medications.     MEDICATION:  Roxicodone 5 mg 1-2 q 4 hours PRN for pain  Zofran 4 mg q 8 hours PRN for nausea and vomiting.  Aspirin 81mg BID x 2 weeks for DVT prophylaxis starting on the evening after surgery.      Post op meds to be delivered bedside prior to discharge. Deliver to family if patient is in surgery at 5pm.     Patient was instructed to purchase and take Colace to counter possible GI side effects of taking opiates.     DVT prophylaxis was discussed with the patient today including risk factors for developing DVTs and history of DVTs. The patient was asked if any specific recommendations were given from the doctor/s that did pre-operative surgical clearance.      If the patient was previously taking 81mg baby aspirin, they were told to not take additional baby aspirin, using the above stated aspirin and to restart the 81mg aspirin daily after completion of the aspirin dose.      Patient was also told to buy over the counter Prilosec medication and take it once daily for GI protection as long as they are taking NSAIDs or Aspirin.     The patient was told that narcotic pain medications may make them drowsy and instructions were given to not sign legal documents, drive or operate heavy machinery, cars, or equipment while under the influence of narcotic medications.     Dr. Ward was present in clinic and directly involved in the additional informed consent process with signing of paperwork and pre-op evaluation. The patient had the  opportunity to ask Dr. Ward any additional questions and all questions were answered.    As there were no other questions to be asked, he was given my business card along with Dr. Ward's business card if he has any questions or concerns prior to surgery or in the postop period.

## 2020-04-02 NOTE — PATIENT INSTRUCTIONS
Established High Blood Pressure    High blood pressure (hypertension) is a chronic disease. Often, healthcare providers dont know what causes it. But it can be caused by certain health conditions and medicines.  If you have high blood pressure, you may not have any symptoms. If you do have symptoms, they may include headache, dizziness, changes in your vision, chest pain, and shortness of breath. But even without symptoms, high blood pressure thats not treated raises your risk for heart attack and stroke. High blood pressure is a serious health risk and shouldnt be ignored.  A blood pressure reading is made up of two numbers: a higher number over a lower number. The top number is the systolic pressure. The bottom number is the diastolic pressure. A normal blood pressure is a systolic pressure of  less than 120 over a diastolic pressure of less than 80. You will see your blood pressure readings written together. For example, a person with a systolic pressure of 188 and a diastolic pressure of 78 will have 118/78 written in the medical record.  High blood pressure is when either the top number is 140 or higher, or the bottom number is 90 or higher. This must be the result when taking your blood pressure a number of times. The blood pressures between normal and high are called prehypertension.  Home care  If you have high blood pressure, you should do what is listed below to lower your blood pressure. If you are taking medicines for high blood pressure, these methods may reduce or end your need for medicines in the future.  · Begin a weight-loss program if you are overweight.  · Cut back on how much salt you get in your diet. Heres how to do this:  ¨ Dont eat foods that have a lot of salt. These include olives, pickles, smoked meats, and salted potato chips.  ¨ Dont add salt to your food at the table.  ¨ Use only small amounts of salt when cooking.  · Start an exercise program. Talk with your healthcare  provider about the type of exercise program that would be best for you. It doesn't have to be hard. Even brisk walking for 20 minutes 3 times a week is a good form of exercise.  · Dont take medicines that stimulate the heart. This includes many over-the-counter cold and sinus decongestant pills and sprays, as well as diet pills. Check the warnings about hypertension on the label. Before buying any over-the-counter medicines or supplements, always ask the pharmacist about the product's potential interaction with your high blood pressure and your high blood pressure medicines.  · Stimulants such as amphetamine or cocaine could be deadly for someone with high blood pressure. Never take these.  · Limit how much caffeine you get in your diet. Switch to caffeine-free products.  · Stop smoking. If you are a long-time smoker, this can be hard. Talk to your healthcare provider about medicines and nicotine replacement options to help you. Also, enroll in a stop-smoking program to make it more likely that you will quit for good.  · Learn how to handle stress. This is an important part of any program to lower blood pressure. Learn about relaxation methods like meditation, yoga, or biofeedback.  · If your provider prescribed medicines, take them exactly as directed. Missing doses may cause your blood pressure get out of control.  · If you miss a dose or doses, check with your healthcare provider or pharmacist about what to do.  · Consider buying an automatic blood pressure machine. Ask your provider for a recommendation. You can get one of these at most pharmacies.     The American Heart Association recommends the following guidelines for home blood pressure monitoring:  · Don't smoke or drink coffee for 30 minutes before taking your blood pressure.  · Go to the bathroom before the test.  · Relax for 5 minutes before taking the measurement.  · Sit with your back supported (don't sit on a couch or soft chair); keep your feet on  the floor uncrossed. Place your arm on a solid flat surface (like a table) with the upper part of the arm at heart level. Place the middle of the cuff directly above the eye of the elbow. Check the monitor's instruction manual for an illustration.  · Take multiple readings. When you measure, take 2 to 3 readings one minute apart and record all of the results.  · Take your blood pressure at the same time every day, or as your healthcare provider recommends.  · Record the date, time, and blood pressure reading.  · Take the record with you to your next medical appointment. If your blood pressure monitor has a built-in memory, simply take the monitor with you to your next appointment.  · Call your provider if you have several high readings. Don't be frightened by a single high blood pressure reading, but if you get several high readings, check in with your healthcare provider.  · Note: When blood pressure reaches a systolic (top number) of 180 or higher OR diastolic (bottom number) of 110 or higher, seek emergency medical treatment.  Follow-up care  You will need to see your healthcare provider regularly. This is to check your blood pressure and to make changes to your medicines. Make a follow-up appointment as directed. Bring the record of your home blood pressure readings to the appointment.  When to seek medical advice  Call your healthcare provider right away if any of these occur:  · Blood pressure reaches a systolic (upper number) of 180 or higher OR a diastolic (bottom number) of 110 or higher  · Chest pain or shortness of breath  · Severe headache  · Throbbing or rushing sound in the ears  · Nosebleed  · Sudden severe pain in your belly (abdomen)  · Extreme drowsiness, confusion, or fainting  · Dizziness or spinning sensation (vertigo)  · Weakness of an arm or leg or one side of the face  · You have problems speaking or seeing   Date Last Reviewed: 12/1/2016  © 3358-1385 [employer]. [address] [place] [address]  [place] [address]. All rights reserved. This information is not intended as a substitute for professional medical care. Always follow your healthcare professional's instructions.

## 2020-04-03 ENCOUNTER — ANESTHESIA (OUTPATIENT)
Dept: SURGERY | Facility: HOSPITAL | Age: 40
End: 2020-04-03
Payer: COMMERCIAL

## 2020-04-03 ENCOUNTER — TELEPHONE (OUTPATIENT)
Dept: INTERNAL MEDICINE | Facility: CLINIC | Age: 40
End: 2020-04-03

## 2020-04-03 ENCOUNTER — HOSPITAL ENCOUNTER (OUTPATIENT)
Facility: HOSPITAL | Age: 40
Discharge: HOME | End: 2020-04-03
Attending: ORTHOPAEDIC SURGERY | Admitting: ORTHOPAEDIC SURGERY
Payer: COMMERCIAL

## 2020-04-03 DIAGNOSIS — S46.211A RUPTURE OF RIGHT DISTAL BICEPS TENDON, INITIAL ENCOUNTER: ICD-10-CM

## 2020-04-03 DIAGNOSIS — S46.211A RUPTURE OF RIGHT DISTAL BICEPS TENDON: ICD-10-CM

## 2020-04-03 PROCEDURE — 37000008 HC ANESTHESIA 1ST 15 MINUTES: Performed by: ORTHOPAEDIC SURGERY

## 2020-04-03 PROCEDURE — 71000015 HC POSTOP RECOV 1ST HR: Performed by: ORTHOPAEDIC SURGERY

## 2020-04-03 PROCEDURE — C1713 ANCHOR/SCREW BN/BN,TIS/BN: HCPCS | Performed by: ORTHOPAEDIC SURGERY

## 2020-04-03 PROCEDURE — 63600175 PHARM REV CODE 636 W HCPCS: Performed by: ORTHOPAEDIC SURGERY

## 2020-04-03 PROCEDURE — 25000003 PHARM REV CODE 250: Performed by: PHYSICIAN ASSISTANT

## 2020-04-03 PROCEDURE — 36000709 HC OR TIME LEV III EA ADD 15 MIN: Performed by: ORTHOPAEDIC SURGERY

## 2020-04-03 PROCEDURE — 63600175 PHARM REV CODE 636 W HCPCS: Performed by: NURSE ANESTHETIST, CERTIFIED REGISTERED

## 2020-04-03 PROCEDURE — D9220A PRA ANESTHESIA: ICD-10-PCS | Mod: ANES,,, | Performed by: ANESTHESIOLOGY

## 2020-04-03 PROCEDURE — 94761 N-INVAS EAR/PLS OXIMETRY MLT: CPT

## 2020-04-03 PROCEDURE — 37000009 HC ANESTHESIA EA ADD 15 MINS: Performed by: ORTHOPAEDIC SURGERY

## 2020-04-03 PROCEDURE — 25000003 PHARM REV CODE 250: Performed by: NURSE ANESTHETIST, CERTIFIED REGISTERED

## 2020-04-03 PROCEDURE — 99900035 HC TECH TIME PER 15 MIN (STAT)

## 2020-04-03 PROCEDURE — D9220A PRA ANESTHESIA: Mod: CRNA,,, | Performed by: NURSE ANESTHETIST, CERTIFIED REGISTERED

## 2020-04-03 PROCEDURE — 24342 PR REINSERT BI/TRICEPS TENDON,DISTAL: ICD-10-PCS | Mod: RT,,, | Performed by: ORTHOPAEDIC SURGERY

## 2020-04-03 PROCEDURE — 25000003 PHARM REV CODE 250: Performed by: ORTHOPAEDIC SURGERY

## 2020-04-03 PROCEDURE — 36000708 HC OR TIME LEV III 1ST 15 MIN: Performed by: ORTHOPAEDIC SURGERY

## 2020-04-03 PROCEDURE — 63600175 PHARM REV CODE 636 W HCPCS: Performed by: PHYSICIAN ASSISTANT

## 2020-04-03 PROCEDURE — 71000033 HC RECOVERY, INTIAL HOUR: Performed by: ORTHOPAEDIC SURGERY

## 2020-04-03 PROCEDURE — 25000003 PHARM REV CODE 250: Performed by: ANESTHESIOLOGY

## 2020-04-03 PROCEDURE — 24342 REPAIR OF RUPTURED TENDON: CPT | Mod: RT,,, | Performed by: ORTHOPAEDIC SURGERY

## 2020-04-03 PROCEDURE — D9220A PRA ANESTHESIA: ICD-10-PCS | Mod: CRNA,,, | Performed by: NURSE ANESTHETIST, CERTIFIED REGISTERED

## 2020-04-03 PROCEDURE — D9220A PRA ANESTHESIA: Mod: ANES,,, | Performed by: ANESTHESIOLOGY

## 2020-04-03 DEVICE — SYS IMPL DEL DISTAL BICEPS BC: Type: IMPLANTABLE DEVICE | Site: ARM | Status: FUNCTIONAL

## 2020-04-03 RX ORDER — OXYCODONE HYDROCHLORIDE 5 MG/1
5 TABLET ORAL EVERY 4 HOURS PRN
Status: CANCELLED | OUTPATIENT
Start: 2020-04-03

## 2020-04-03 RX ORDER — CEFAZOLIN SODIUM 1 G/3ML
2 INJECTION, POWDER, FOR SOLUTION INTRAMUSCULAR; INTRAVENOUS
Status: DISCONTINUED | OUTPATIENT
Start: 2020-04-03 | End: 2020-04-03 | Stop reason: HOSPADM

## 2020-04-03 RX ORDER — ROCURONIUM BROMIDE 10 MG/ML
INJECTION, SOLUTION INTRAVENOUS
Status: DISCONTINUED | OUTPATIENT
Start: 2020-04-03 | End: 2020-04-03

## 2020-04-03 RX ORDER — MUPIROCIN 20 MG/G
OINTMENT TOPICAL
Status: DISCONTINUED | OUTPATIENT
Start: 2020-04-03 | End: 2020-04-03 | Stop reason: HOSPADM

## 2020-04-03 RX ORDER — CELECOXIB 200 MG/1
400 CAPSULE ORAL ONCE
Status: COMPLETED | OUTPATIENT
Start: 2020-04-03 | End: 2020-04-03

## 2020-04-03 RX ORDER — SUCCINYLCHOLINE CHLORIDE 20 MG/ML
INJECTION INTRAMUSCULAR; INTRAVENOUS
Status: DISCONTINUED | OUTPATIENT
Start: 2020-04-03 | End: 2020-04-03

## 2020-04-03 RX ORDER — VANCOMYCIN HYDROCHLORIDE 500 MG/10ML
INJECTION, POWDER, LYOPHILIZED, FOR SOLUTION INTRAVENOUS
Status: DISCONTINUED | OUTPATIENT
Start: 2020-04-03 | End: 2020-04-03 | Stop reason: HOSPADM

## 2020-04-03 RX ORDER — ONDANSETRON 4 MG/1
8 TABLET, ORALLY DISINTEGRATING ORAL EVERY 8 HOURS PRN
Status: CANCELLED | OUTPATIENT
Start: 2020-04-03

## 2020-04-03 RX ORDER — METOCLOPRAMIDE HYDROCHLORIDE 5 MG/ML
5 INJECTION INTRAMUSCULAR; INTRAVENOUS EVERY 6 HOURS PRN
Status: CANCELLED | OUTPATIENT
Start: 2020-04-03

## 2020-04-03 RX ORDER — FENTANYL CITRATE 50 UG/ML
INJECTION, SOLUTION INTRAMUSCULAR; INTRAVENOUS
Status: DISCONTINUED | OUTPATIENT
Start: 2020-04-03 | End: 2020-04-03

## 2020-04-03 RX ORDER — OXYCODONE HYDROCHLORIDE 5 MG/1
10 TABLET ORAL EVERY 4 HOURS PRN
Status: CANCELLED | OUTPATIENT
Start: 2020-04-03

## 2020-04-03 RX ORDER — PROPOFOL 10 MG/ML
VIAL (ML) INTRAVENOUS
Status: DISCONTINUED | OUTPATIENT
Start: 2020-04-03 | End: 2020-04-03

## 2020-04-03 RX ORDER — PHENYLEPHRINE HYDROCHLORIDE 10 MG/ML
INJECTION INTRAVENOUS
Status: DISCONTINUED | OUTPATIENT
Start: 2020-04-03 | End: 2020-04-03

## 2020-04-03 RX ORDER — SODIUM CHLORIDE 9 MG/ML
INJECTION, SOLUTION INTRAVENOUS CONTINUOUS
Status: DISCONTINUED | OUTPATIENT
Start: 2020-04-03 | End: 2020-04-03 | Stop reason: HOSPADM

## 2020-04-03 RX ORDER — FENTANYL CITRATE 50 UG/ML
25 INJECTION, SOLUTION INTRAMUSCULAR; INTRAVENOUS EVERY 5 MIN PRN
Status: DISCONTINUED | OUTPATIENT
Start: 2020-04-03 | End: 2020-04-03 | Stop reason: HOSPADM

## 2020-04-03 RX ORDER — SODIUM CHLORIDE 0.9 % (FLUSH) 0.9 %
10 SYRINGE (ML) INJECTION
Status: DISCONTINUED | OUTPATIENT
Start: 2020-04-03 | End: 2020-04-03 | Stop reason: HOSPADM

## 2020-04-03 RX ORDER — LIDOCAINE HYDROCHLORIDE 20 MG/ML
INJECTION INTRAVENOUS
Status: DISCONTINUED | OUTPATIENT
Start: 2020-04-03 | End: 2020-04-03

## 2020-04-03 RX ORDER — ROPIVACAINE/EPI/CLONIDINE/KET 2.46-0.005
SYRINGE (ML) INJECTION
Status: DISCONTINUED | OUTPATIENT
Start: 2020-04-03 | End: 2020-04-03 | Stop reason: HOSPADM

## 2020-04-03 RX ORDER — ACETAMINOPHEN 500 MG
1000 TABLET ORAL EVERY 8 HOURS
Status: DISCONTINUED | OUTPATIENT
Start: 2020-04-03 | End: 2020-04-03 | Stop reason: HOSPADM

## 2020-04-03 RX ORDER — ONDANSETRON 2 MG/ML
INJECTION INTRAMUSCULAR; INTRAVENOUS
Status: DISCONTINUED | OUTPATIENT
Start: 2020-04-03 | End: 2020-04-03

## 2020-04-03 RX ORDER — MIDAZOLAM HYDROCHLORIDE 1 MG/ML
INJECTION, SOLUTION INTRAMUSCULAR; INTRAVENOUS
Status: DISCONTINUED | OUTPATIENT
Start: 2020-04-03 | End: 2020-04-03

## 2020-04-03 RX ORDER — ACETAMINOPHEN 500 MG
1000 TABLET ORAL ONCE
Status: COMPLETED | OUTPATIENT
Start: 2020-04-03 | End: 2020-04-03

## 2020-04-03 RX ORDER — KETAMINE HCL IN 0.9 % NACL 50 MG/5 ML
SYRINGE (ML) INTRAVENOUS
Status: DISCONTINUED | OUTPATIENT
Start: 2020-04-03 | End: 2020-04-03

## 2020-04-03 RX ORDER — TRAMADOL HYDROCHLORIDE 50 MG/1
50 TABLET ORAL EVERY 4 HOURS PRN
Status: DISCONTINUED | OUTPATIENT
Start: 2020-04-03 | End: 2020-04-03 | Stop reason: HOSPADM

## 2020-04-03 RX ORDER — OXYCODONE HYDROCHLORIDE 5 MG/1
5 TABLET ORAL
Status: DISCONTINUED | OUTPATIENT
Start: 2020-04-03 | End: 2020-04-03 | Stop reason: HOSPADM

## 2020-04-03 RX ADMIN — PHENYLEPHRINE HYDROCHLORIDE 100 MCG: 10 INJECTION INTRAVENOUS at 10:04

## 2020-04-03 RX ADMIN — MIDAZOLAM 2 MG: 1 INJECTION INTRAMUSCULAR; INTRAVENOUS at 09:04

## 2020-04-03 RX ADMIN — SUCCINYLCHOLINE CHLORIDE 140 MG: 20 INJECTION, SOLUTION INTRAMUSCULAR; INTRAVENOUS at 09:04

## 2020-04-03 RX ADMIN — CELECOXIB 400 MG: 200 CAPSULE ORAL at 07:04

## 2020-04-03 RX ADMIN — FENTANYL CITRATE 100 MCG: 50 INJECTION, SOLUTION INTRAMUSCULAR; INTRAVENOUS at 09:04

## 2020-04-03 RX ADMIN — OXYCODONE HYDROCHLORIDE 5 MG: 5 TABLET ORAL at 12:04

## 2020-04-03 RX ADMIN — ACETAMINOPHEN 1000 MG: 500 TABLET ORAL at 07:04

## 2020-04-03 RX ADMIN — MUPIROCIN: 20 OINTMENT TOPICAL at 07:04

## 2020-04-03 RX ADMIN — ROCURONIUM BROMIDE 5 MG: 10 INJECTION, SOLUTION INTRAVENOUS at 09:04

## 2020-04-03 RX ADMIN — ONDANSETRON 4 MG: 2 INJECTION INTRAMUSCULAR; INTRAVENOUS at 10:04

## 2020-04-03 RX ADMIN — SODIUM CHLORIDE, SODIUM GLUCONATE, SODIUM ACETATE, POTASSIUM CHLORIDE, MAGNESIUM CHLORIDE, SODIUM PHOSPHATE, DIBASIC, AND POTASSIUM PHOSPHATE: .53; .5; .37; .037; .03; .012; .00082 INJECTION, SOLUTION INTRAVENOUS at 10:04

## 2020-04-03 RX ADMIN — SODIUM CHLORIDE 1000 ML: 0.9 INJECTION, SOLUTION INTRAVENOUS at 07:04

## 2020-04-03 RX ADMIN — LIDOCAINE HYDROCHLORIDE 100 MG: 20 INJECTION, SOLUTION INTRAVENOUS at 09:04

## 2020-04-03 RX ADMIN — PROPOFOL 200 MG: 10 INJECTION, EMULSION INTRAVENOUS at 09:04

## 2020-04-03 RX ADMIN — Medication 30 MG: at 09:04

## 2020-04-03 RX ADMIN — CEFAZOLIN 2 G: 330 INJECTION, POWDER, FOR SOLUTION INTRAMUSCULAR; INTRAVENOUS at 09:04

## 2020-04-03 NOTE — TELEPHONE ENCOUNTER
There was an order placed yesterday at the time of the visit.  And the EKG has been resulted and is in his chart.  What do you need me to do?

## 2020-04-03 NOTE — INTERVAL H&P NOTE
The patient has been examined and the H&P has been reviewed:    I concur with the findings and no changes have occurred since H&P was written.    Anesthesia/Surgery risks, benefits and alternative options discussed and understood by patient/family.          Active Hospital Problems    Diagnosis  POA    Rupture of right distal biceps tendon [S46.211A]  Yes      Resolved Hospital Problems   No resolved problems to display.

## 2020-04-03 NOTE — ANESTHESIA POSTPROCEDURE EVALUATION
Anesthesia Post Evaluation    Patient: Ryan Ancalade    Procedure(s) Performed: Procedure(s) (LRB):  REPAIR, TENDON, BICEPS, DISTAL (Right)    Final Anesthesia Type: general    Patient location during evaluation: PACU  Patient participation: Yes- Able to Participate  Level of consciousness: awake and alert  Post-procedure vital signs: reviewed and stable  Pain management: adequate  Airway patency: patent    PONV status at discharge: No PONV  Anesthetic complications: no      Cardiovascular status: blood pressure returned to baseline  Respiratory status: unassisted  Hydration status: euvolemic  Follow-up not needed.          Vitals Value Taken Time   /92 4/3/2020 12:01 PM   Temp 36.7 °C (98 °F) 4/3/2020 12:15 PM   Pulse 83 4/3/2020 12:17 PM   Resp 18 4/3/2020 12:15 PM   SpO2 99 % 4/3/2020 12:15 PM   Vitals shown include unvalidated device data.      Event Time     Out of Recovery 11:46:00          Pain/Susan Score: Pain Rating Prior to Med Admin: 4 (4/3/2020 12:04 PM)  Pain Rating Post Med Admin: 4 (4/3/2020 12:13 PM)  Susan Score: 10 (4/3/2020 11:59 AM)

## 2020-04-03 NOTE — TELEPHONE ENCOUNTER
----- Message from RT Norma sent at 4/3/2020  8:36 AM CDT -----  Regarding: Need EKG order placed  Please put the EKG order in for the EKG performed on   4/2       ,  then attach the order to the EKG appointment or the MD appointment for the date performed.        Thank you

## 2020-04-03 NOTE — TRANSFER OF CARE
Anesthesia Transfer of Care Note    Patient: Ryan Ancalade    Procedure(s) Performed: Procedure(s) (LRB):  REPAIR, TENDON, BICEPS, DISTAL (Right)    Patient location: PACU    Anesthesia Type: general    Transport from OR: Transported from OR on 6-10 L/min O2 by face mask with adequate spontaneous ventilation    Post pain: adequate analgesia    Post assessment: no apparent anesthetic complications    Post vital signs: stable    Level of consciousness: sedated    Nausea/Vomiting: no nausea/vomiting    Complications: none    Transfer of care protocol was followed      Last vitals:   Visit Vitals  BP (!) 152/98   Pulse 86   Resp 18   SpO2 98%

## 2020-04-03 NOTE — OP NOTE
[facility]   OPERATIVE REPORT   ORTHOPAEDIC SURGERY   PROVIDER: DR. MARY BIRCH    PATIENT INFORMATION   Ryan Luna 39 y.o. male 1980   MRN: [id number]   LOCATION: [facility]     DATE OF PROCEDURE: 4/3/2020     PREOPERATIVE DIAGNOSES:   Rupture of right distal biceps tendon, acute    POSTOPERATIVE DIAGNOSES:   Rupture of right distal biceps tendon, acute    PROCEDURES PERFORMED:   Right elbow distal biceps repair, volar single incision approach (CPT 03852)    Surgeon(s) and Role:     * CATHY Birch MD - Primary     * Jimenez Tracey MD - Fellow    ANESTHESIA: General with local anesthetic injection (SANDRA, 30 cc)    ESTIMATED BLOOD LOSS: 10 cc    IMPLANTS:   Implant Name Type Inv. Item Serial No.  Lot No. LRB No. Used   SYS IMPL DEL DISTAL BICEPS BC - [id number]  SYS IMPL DEL DISTAL BICEPS BC  [employer] [id number] 1      FINDINGS:  Full-thickness distal biceps rupture    SPECIMENS:   Specimen (12h ago, onward)    None        COMPLICATIONS: None.     INTRAOPERATIVE COUNTS: Correct.     PROPHYLACTIC IV ANTIBIOTICS: Given per [facility] Protocol.     BRIEF INDICATION OF MEDICAL NECESSITY: Ryan is a 39 year-old male  who presented with an acute right elbow injury. Exam findings and MRI study were diagnostic for a distal biceps rupture. Treatment options were discussed and I have recommended operative intervention.  Given the patient's occupation and concerns regarding delay in treatment which could potentially compromise his outcome, the patient was indicated to proceed urgently.  Given the current circumstances with the viral pandemic, this case was submitted to the Orthopedic Department leadership for review and was subsequently approved to proceed now which was the patient's wish.  The details of distal biceps repair and its associated rehabilitation/recovery course were reviewed.  The risks and potential complications of surgery include but are not limited to:  neurovascular injury, most commonly lateral antebrachial cutaneous nerve injury, heterotopic ossification, fracture, elbow stiffness, weakness, blood clot formation, tissue re-tear, hematoma, seroma, infection, scarring, and to inability return to previous level of activity.  The patient understands these details and has elected to proceed with surgery.    DETAILS OF THE PROCEDURE: After permit was obtained for the above procedure, the patient was given IV antibiotic and brought back to the operating room, placed under general anesthesia and placed into the supine position, safely secured along with placement of the right arm positioned, elbow extended and forearm supinated.  Standard prepping and draping were completed.  A sterile tourniquet was utilized.    The esmarch was used to wrap out the arm and the tourniquet was elevated to 200 mm of mercury.    A 3 cm incision was made along Langers lines 1 inch distal to and parallel the antecubital crease. Location for the incision was localized in reference to the bicipital tuberosity using fluoro.  The subcutaneous tissue was divided and a finger sweep proximal revealed the torn bicep tendon stump.  The lateral antebrachial cutaneous nerve was identified more superficially and protected throughout the case. Hematoma was evacuated. Finger dissection was then carried out down the internervous and intermuscular pathway of the torn tendon to the radial tuberosity insertion point.   Retractors were placed safely protecting the nervous and vascular structures.  The forearm was held in the fully supinated position throughout the case to protect the PIN. The tendon stump was freshened and one  #2 Fiberloop was used to whipstitch the tendon in multiplanar fashion from proximal to distal, tying these sutures at the distal end.  Again with maximal supination to give proper visualization of the tuberosity and to protect the PIN, a 3.5 mm pin was placed at the distal ulnar portion  of the tuberosity, bicortically then followed by a 6 mm reamer uni cortically. The tendon diameter was measured at 6 mm. Irrigation was copiously completed to remove all bony debris and the free suture ends were then woven thru the titanium Arthrex bicep button. The button was then delivered and flipped over the posterior cortex of the radius, which was verified with fluoro. The two suture ends were then tightened as the elbow was flexed 30 degrees to allow for the stump to deliver distally into the native insertion site Both limbs of the suture were then passed through the tendon with a free needle and tied to one another to complete the fixation.  At least 10 mm of tendon was delivered into the bone tunnel with excellent tendon to bone fit.  The elbow was gnetly flexed and extended several times to verify complete security of the repair. The tourniquet was then released and hemostasis achieved.  The wound was irrigated and closed in layers.  A posterior splint with a sugar tong component and wrap was applied with the elbow held at 80-90 degrees flexion and forearm supination. 500 mg of vancomycin powder was placed within the wound prior to closure.  The tourniquet also was released prior to wound closure to confirm there was no significant bleeding.  There was confirmed to be good perfusion distally in the arm.    Dressings were applied and a sling placed. The patient was awakened and taken to recovery in satisfactory condition.  There were no apparent complications.    POSTOPERATIVE PLAN:  I will see the patient back in my clinic in 12 days approximately for splint removal.  Plan to convert to a hinged elbow brace at that time.  Sling for nighttime use.  Naprosyn given for a 21 day course for HO prophylaxis.

## 2020-04-03 NOTE — BRIEF OP NOTE
[facility]  Brief Operative Note    Surgery Date: 4/3/2020     Surgeon(s) and Role:     * CATHY Ward MD - Primary     * Jimenez Tracey MD - Fellow    Assisting Surgeon: None    Pre-op Diagnosis:  Rupture of right distal biceps tendon, initial encounter [S46.211A]    Post-op Diagnosis:  Post-Op Diagnosis Codes:     * Rupture of right distal biceps tendon, initial encounter [S46.211A]    Procedure(s) (LRB):  REPAIR, TENDON, BICEPS, DISTAL (Right)    Anesthesia: General    Description of the findings of the procedure(s):   1.  Right distal biceps repair    Estimated Blood Loss: * No values recorded between 4/3/2020  9:56 AM and 4/3/2020 11:15 AM *         Specimens:   Specimen (12h ago, onward)    None            Discharge Note    OUTCOME: Patient tolerated treatment/procedure well without complication and is now ready for discharge.    DISPOSITION: Home or Self Care    FINAL DIAGNOSIS:  Rupture of right distal biceps tendon    FOLLOWUP: In clinic     DISCHARGE INSTRUCTIONS:    Discharge Procedure Orders   Diet general     Call MD for:  temperature >100.4     Call MD for:  persistent nausea and vomiting     Call MD for:  severe uncontrolled pain     Call MD for:  difficulty breathing, headache or visual disturbances     Call MD for:  redness, tenderness, or signs of infection (pain, swelling, redness, odor or green/yellow discharge around incision site)     Call MD for:  hives     Call MD for:  persistent dizziness or light-headedness     Call MD for:  extreme fatigue

## 2020-04-03 NOTE — PLAN OF CARE
VSS.  Patient tolerating oral liquids without difficulty. No apparent s&s of distress noted at this time, no complaints voiced at this time. Discharge instructions reviewed with patient and wife with good verbal feedback received. Arm sling in place.  Post op medications delivered via bedside delivery.  Patient ready for discharge.

## 2020-04-07 VITALS
HEART RATE: 81 BPM | RESPIRATION RATE: 18 BRPM | DIASTOLIC BLOOD PRESSURE: 92 MMHG | TEMPERATURE: 98 F | OXYGEN SATURATION: 99 % | SYSTOLIC BLOOD PRESSURE: 143 MMHG

## 2020-04-13 ENCOUNTER — PATIENT MESSAGE (OUTPATIENT)
Dept: INTERNAL MEDICINE | Facility: CLINIC | Age: 40
End: 2020-04-13

## 2020-04-13 ENCOUNTER — PATIENT MESSAGE (OUTPATIENT)
Dept: SPORTS MEDICINE | Facility: CLINIC | Age: 40
End: 2020-04-13

## 2020-04-16 ENCOUNTER — CLINICAL SUPPORT (OUTPATIENT)
Dept: REHABILITATION | Facility: HOSPITAL | Age: 40
End: 2020-04-16
Attending: ORTHOPAEDIC SURGERY
Payer: COMMERCIAL

## 2020-04-16 ENCOUNTER — PATIENT MESSAGE (OUTPATIENT)
Dept: SPORTS MEDICINE | Facility: CLINIC | Age: 40
End: 2020-04-16

## 2020-04-16 ENCOUNTER — HOSPITAL ENCOUNTER (OUTPATIENT)
Dept: RADIOLOGY | Facility: HOSPITAL | Age: 40
Discharge: HOME | End: 2020-04-16
Attending: PHYSICIAN ASSISTANT
Payer: COMMERCIAL

## 2020-04-16 ENCOUNTER — OFFICE VISIT (OUTPATIENT)
Dept: SPORTS MEDICINE | Facility: CLINIC | Age: 40
End: 2020-04-16
Payer: COMMERCIAL

## 2020-04-16 VITALS
HEIGHT: 69 IN | HEART RATE: 93 BPM | WEIGHT: 201 LBS | BODY MASS INDEX: 29.77 KG/M2 | SYSTOLIC BLOOD PRESSURE: 143 MMHG | DIASTOLIC BLOOD PRESSURE: 95 MMHG

## 2020-04-16 DIAGNOSIS — S46.211A RUPTURE OF RIGHT DISTAL BICEPS TENDON, INITIAL ENCOUNTER: ICD-10-CM

## 2020-04-16 DIAGNOSIS — Z47.89 SURGICAL AFTERCARE, MUSCULOSKELETAL SYSTEM: ICD-10-CM

## 2020-04-16 DIAGNOSIS — Z47.89 SURGICAL AFTERCARE, MUSCULOSKELETAL SYSTEM: Primary | ICD-10-CM

## 2020-04-16 DIAGNOSIS — M25.60 RANGE OF MOTION DEFICIT: ICD-10-CM

## 2020-04-16 PROCEDURE — 99999 PR PBB SHADOW E&M-EST. PATIENT-LVL III: ICD-10-PCS | Mod: PBBFAC,,, | Performed by: ORTHOPAEDIC SURGERY

## 2020-04-16 PROCEDURE — 99999 PR PBB SHADOW E&M-EST. PATIENT-LVL III: CPT | Mod: PBBFAC,,, | Performed by: ORTHOPAEDIC SURGERY

## 2020-04-16 PROCEDURE — 99024 POSTOP FOLLOW-UP VISIT: CPT | Mod: S$GLB,,, | Performed by: ORTHOPAEDIC SURGERY

## 2020-04-16 PROCEDURE — 73080 X-RAY EXAM OF ELBOW: CPT | Mod: 26,RT,, | Performed by: RADIOLOGY

## 2020-04-16 PROCEDURE — 73080 X-RAY EXAM OF ELBOW: CPT | Mod: TC,RT

## 2020-04-16 PROCEDURE — 99024 PR POST-OP FOLLOW-UP VISIT: ICD-10-PCS | Mod: S$GLB,,, | Performed by: ORTHOPAEDIC SURGERY

## 2020-04-16 PROCEDURE — 73080 XR ELBOW COMPLETE 3 VIEW RIGHT: ICD-10-PCS | Mod: 26,RT,, | Performed by: RADIOLOGY

## 2020-04-16 PROCEDURE — L3763 EWHO RIGID W/O JNTS CF: HCPCS

## 2020-04-16 RX ORDER — OXYCODONE HYDROCHLORIDE 5 MG/1
5 TABLET ORAL EVERY 8 HOURS PRN
Qty: 21 TABLET | Refills: 0 | Status: SHIPPED | OUTPATIENT
Start: 2020-04-16 | End: 2020-04-23

## 2020-04-16 NOTE — PROGRESS NOTES
S:Ryan Luna presents for post-operative evaluation.     PROCEDURES PERFORMED:    Right elbow distal biceps repair, volar single incision approach    Ryan Luna reports to be doing well 2wk s/p the above mentioned procedure.  Denies any significant pain.  No numbness or tingling.  Doing well.  Splint removed.  No complaints.    O:  Exam of the right elbow after splint removal in clinic shows the incision to be well-healed.  No hematoma.  No unusual swelling.  Sensation intact to light touch over the lateral antebrachial cutaneous distribution.  Intact motor and sensory function distally.  Intact distal biceps repair.  Flexion with these to 100°, extension to 30° short of full.    Radiographs of the right elbow demonstrate evidence of distal biceps repair.  Cortical button intact at appropriate position.  No evidence of heterotopic ossification    A/P:  Discussed rehab plan.  The patient was walked over to Occupational Therapy today for therapy and fabrication of a removable thermoplastic splint which he will wear at all times during the day.  May transition to a sling for nighttime use.  He understands his activity restrictions.  No lifting more than a Coke can or small book.  Work on range of motion.  I will see him back in 4 weeks.  Plan to discontinue use of the brace at that time.  Strengthening phase begins at 12 weeks postop.  Expect more of a full release between 16 and 20 weeks postop.  Continue Naprosyn for a full 21 days postop for HO prophylaxis

## 2020-04-16 NOTE — TELEPHONE ENCOUNTER
Patient seen in clinic today by Dr. Ward.  Requesting refill of pain medication as he is 2 weeks post op and having pain with PT.  La  reviewed.  Refill sent to pharmacy.    Maximilian Gonzales PA-C

## 2020-04-16 NOTE — PATIENT INSTRUCTIONS
[facility], OCCUPATIONAL THERAPY  HOME EXERCISE PROGRAM     Complete the following exercises with 10 repetitions each, 4 x/day.     PROM: Elbow Flexion / Extension          Bend elbow fully and straighten elbow to 90 degrees with thumb up.      Shoulder retraction/protaction with elbows at 90 degrees and by your side.    Orthosis Instructions and Proof of Delivery    Date: 4/16/2020  Name: Ryan Luna  Patient Address: @[facility]@  Referring Provider: CATHY Ward MD    [facility] Level II Code and Description   custom fabricated posterior long arm orthosis  *    Orthosis Information  ( x ) Custom Fabricated  (  ) Pre-fabricated  ( x ) Right  (  ) Left  (  ) Bilateral  ( x ) Static  (  ) Static Progressive  (  ) Dynamic    Orthosis Instructions  ( x ) Wear the orthosis at all times  ( x ) Remove for hygiene, exercises, dressing changes    Cleaning and Maintenance  Keep your orthosis away from any heat source. Do not leave in your car.  Keep your orthosis away from pets.  You may clean your orthosis with cool soap and water or a mild cleanser.  Your orthosis may need adjustments due to changes in your medical condition (swelling, dressing size, additional surgery, etc.)  Monitor your skin color and integrity.  Do not try to adjust the orthosis on your own.     If you have any questions or concerns, please contact the therapy office.       I, Ryan Luna , have personally received the above described orthosis along with instructions on the wear, care, and precautions related to this orthosis.      Signature: _________________________________ Date: __________________      Therapist Name: ITALO Townsend, [place]

## 2020-04-16 NOTE — PLAN OF CARE
[facility] Occupational Therapy  Orthosis Note - Certificate of Medical Necessity      Date: 4/16/2020  Name: Ryan Luna  Clinic Number: [id number]        Medical Diagnosis: S46.211A (ICD-10-CM) - Rupture of right distal biceps tendon, initial encounter   Therapy Diagnosis:   Encounter Diagnoses   Name Primary?    Rupture of right distal biceps tendon, initial encounter     Surgical aftercare, musculoskeletal system     Range of motion deficit      Precautions: Standard    Physician: CATHY Ward MD  Physician Orders: Custom LAS, eval and treat per protocol  Date of Order: 4/3/2020    Insurance Authorization period Expiration: 12/31/2020       Time In:1:45 pm  Time Out: 2:25pm  [facility] Level II Code and Description:  custom fabricated posterior long arm orthosis    Subjective     Involved Side: Right  Date of Onset: 3/26/2020  Date of Surgery: 4/3/2020  Surgical Procedure: Right elbow distal biceps repair, volar single incision approach (CPT 22335)       History of Current Condition: Pt was lifting a dresser and his biceps popped.  Previous Therapy: no    Past Medical History/Physical Systems Review:   Past Medical History:   Diagnosis Date    Hypertension      Ryan Luna  has a past surgical history that includes Elbow surgery.    Ryan has a current medication list which includes the following prescription(s): losartan, naproxen, ondansetron, and oxycodone.    Review of patient's allergies indicates:  No Known Allergies     Objective      None Pitting Mild Moderate Severe   Edema   x        No Deficits Mild Deficits Mod Deficits Severe Deficits   ROM   x        Custom Fabricated Orthosis  The custom orthosis was fabricated as requested using low-temperature plastic material. A pattern was measured then cut and custom molded to accommodate this individual patient.   (x) Long Arm Orthosis  () Forearm-Based Orthosis  () Radial-Based Orthosis  () Ulnar-Based Orthosis  ()  Hand-Based Orthosis  () Finger-Based Orthosis  () Full Circumference  (x) Half Circumference  (x) Static  () Static Progressive  () Dynamic    Purpose of Orthosis  () Protect Recent Injury   (xx) Protect Surgical Procedure  () Maintain Joint Positioning  () Increase Motion    HEP / Patient Instructions      See patient instructions section for orthosis instructions.  Patient was instructed verbally, signed, and provided with copy of written proof of delivery as well as information regarding wear schedule, care, and precautions of orthosis.    Assessment      The custom orthosis appeared to fit well with no problems noted. There were no complaints of discomfort from the patient at this time. The patient demonstrated competency with independently doffing and donning orthosis.    Plan     Frequency and Duration: 1 x week x 8 weeks  Orthosis to be worn at all times except for hygiene and exercises..  Orthosis checks PRN.    I certify that the orthosis was performed by or under the direct supervision of a qualified Occupational Therapist.   Therapist: ITALO Townsend, [place]

## 2020-04-23 ENCOUNTER — CLINICAL SUPPORT (OUTPATIENT)
Dept: REHABILITATION | Facility: HOSPITAL | Age: 40
End: 2020-04-23
Attending: ORTHOPAEDIC SURGERY
Payer: COMMERCIAL

## 2020-04-23 DIAGNOSIS — M25.60 RANGE OF MOTION DEFICIT: ICD-10-CM

## 2020-04-23 PROCEDURE — 97110 THERAPEUTIC EXERCISES: CPT

## 2020-04-23 PROCEDURE — 97165 OT EVAL LOW COMPLEX 30 MIN: CPT

## 2020-04-23 NOTE — PATIENT INSTRUCTIONS
[facility], OCCUPATIONAL THERAPY  HOME EXERCISE PROGRAM     Perform 10 repetitions of the following stretch, holding for 5 seconds per stretch. Complete 4 times per day.  Elbow Flexion Stretch   Use other hand to bend elbow toward   same shoulder.      Elbow Extension Stretch  Use other hand to stretch elbow straight   over the edge of a table or sofa.    Copyright © I. All rights reserved.     PROM: Supination / Pronation   With your elbow by your side, turn your palm up then turn your palm down using your left hand.    Copyright © I. All rights reserved.     Therapist: ITALO Townsend CHT

## 2020-04-23 NOTE — PLAN OF CARE
"  [facility] Occupational Therapy  Initial Evaluation     Name: Ryan Luna  Clinic Number: [id number]    Therapy Diagnosis:   Encounter Diagnosis   Name Primary?    Range of motion deficit      Physician: CATHY Ward MD    Physician Orders: Eval and treat per protocol    Medical Diagnosis: Right elbow distal biceps repair, volar single incision approach (CPT 85790)     Surgical Procedure and Date: 4/3/2020     Evaluation Date: 4/23/2020  [facility]  Insurance Authorization period Expiration: 12/31/2020     Plan of Care Certification Period: 4/23/2020 to 6/23/20    Visit # / Visits Authortized: 2 / 20  Time In:2:00 pm  Time Out: 3:00 pm  Total Billable Time: 60 minutes    Precautions: Standard    Subjective     Involved Side: right  Dominant Side: Right  Date of Onset: 4/3/2020  Mechanism of Injury: Pt was moving a dresser and felt and heard a pop in his elbow.  History of Current Condition: Pt went to ER, he was put in sling, had X-ray, given anti-inflammatories. Pt then saw another physician who then ordered an MRI. Following the MRI, pt was scheduled for sx. Pt was in post op dressing for 2 weeks then a custom LAS was fabricated.  Imaging: MRI studies   Impression       Full-thickness tear of the distal aspect of the biceps tendon with retraction of the tendon proximally as described above.    Small amount of fluid in the elbow joint.     X-ray 4/16/2020 FINDINGS:  Since the previous examination the patient has undergone a right distal biceps repair.  No acute fracture or bony destructive process is seen.  There is mild soft tissue swelling about the elbow.  There is no elevation of the fat pads to suggest joint effusion.  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     Current FunctionalStatus   Home/Living environment : lives with their spouse      Limitation of Functional Status as follows:   ADLs/IADLs:     - Feeding:Independent    - Bathing: Independent    - Dressing/Grooming: Independent    - Driving: Independent     Leisure: playing video games      Past Medical History/Physical Systems Review:   Ryan Luna  has a past medical history of Hypertension.    Ryan Luna  has a past surgical history that includes Elbow surgery.    Ryan has a current medication list which includes the following prescription(s): losartan, naproxen, ondansetron, and oxycodone.    Review of patient's allergies indicates:  No Known Allergies       Objective   Observation/Appearance:  Skin intact and steri-strips in intact.    Edema. Measured in centimeters.   4/23/2020 4/23/2020    Left Right   2in. Above elbow 30.8 30.8   2in. Below elbow 29 30   Elbow Crease 27 30.5       Wrist ROM  Date 4/23/2020    Right   Elbow ext/flex 40/120   Supination/Pronation 50/80   Wrist ext/flex WNL   Wrist RD/UD WNL       Sensation:  Intact     Strength (Dyanmometer) and Pinch Strength (Pinch Gauge)  Measured in pounds and psi. Average of three trials.   4/23/2020 4/23/2020    Left Right   Rung II def def         Treatment     Treatment Time In: 2:00 pm  Treatment Time Out: 3:00 pm  Total Treatment time separate from Evaluation time:15minutes    Ryan received the following supervised modalities after being cleared for contradictions for 15 minutes:   -Patient received MH x 15 min to left elbow to increase blood flow, circulation and tissue elasticity prior to therex      Ryan received the  following manual therapy techniques for 0 minutes:   -NT    Ryan received therapeutic exercises for 15 minutes including:  -Active/gentle passive ext of elbow, passive elbow flex, passive sup/pron x 10 reps    Ryan participated in dynamic functional therapeutic activities to improve functional performance for 0  minutes, including:  -NT    Home Exercise Program/Education:  Issued HEP (see patient instructions in EMR) and educated on modality use for pain management . Exercises were reviewed and Ryan was able to demonstrate them prior to the end of the session.   Pt received a written copy of exercises to perform at home. Ryan demonstrated good  understanding of the education provided.  Pt was advised to perform these exercises free of pain, and to stop performing them if pain occurs.    Patient/Family Education: role of OT, goals for OT, scheduling/cancellations - pt verbalized understanding. Discussed insurance limitations with patient.    Additional Education provided: Issued Tubigrip F for edema management    Assessment     Ryan Luna is a 39 y.o. male referred to outpatient occupational/hand therapy and presents with a medical diagnosis of right distal biceps rupture, resulting in pain, edema, range of motion deficit, decreased strength and demonstrates limitations as described in the chart below. Following a brief medical record review it is determined that pt will benefit from occupational therapy services in order to maximize pain free and/or functional use of left elbow.     The patient's rehab potential is Excellent.     Anticipated barriers to occupational therapy: none  Pt has no cultural, educational or language barriers to learning provided.    Profile and History Assessment of Occupational Performance Level of Clinical Decision Making Complexity Score   Occupational Profile:   Ryan Luna is a 39 y.o. male who lives with their spouse and is currently employed as mail shipping . Ryan  "Jeremy has difficulty withhousework/household chores  affecting his/her daily functional abilities. His/her main goal for therapy is "get back 100% of my right arm".     Comorbidities:    has a past medical history of Hypertension.        Medical and Therapy History Review:   Brief               Performance Deficits    Physical:  Joint Mobility  Muscle Power/Strength  Muscle Endurance  Skin Integrity/Scar Formation  Edema   Strength  Pain    Cognitive:  No Deficits    Psychosocial:    No Deficits     Clinical Decision Making:  low    Assessment Process:  Problem-Focused Assessments    Modification/Need for Assistance:  Not Necessary    Intervention Selection:  Limited Treatment Options       low  Based on PMHX, co morbidities , data from assessments and functional level of assistance required with task and clinical presentation directly impacting function.       The following goals were discussed with the patient and patient is in agreement with them as to be addressed in the treatment plan.     Goals:   Short Term (4 weeks on 5/23/2020):  1)   Patient to be IND with HEP and modalities for pain management  2)   Increase ROM 15-20 degrees for elbow ext/flex and sup/pron to increase functional hand use for reaching objects.  3)   Measure  strength once post op 6 weeks.  4)   Decrease edema .2-.3 cm to increase joint mobility /flexibility for improved overall functional hand use.       Long Term (by discharge):  1)   Pt will report 0 out of 10 pain with RUE activities.  2)   Pt will return to prior level of function for ADLs and household management.       Plan   Certification Period/Plan of care expiration: 4/23/2020 to 6/23/2020.    Outpatient Occupational Therapy 1 time weekly for 8 weeks may include the following interventions: Manual therapy/joint mobilizations, Modalities for pain management, Therapeutic exercises/activities., Strengthening, Orthotic Fabrication/Fit/Training, Edema Control and Scar " Management.      Deborah Asif, OT

## 2020-04-30 ENCOUNTER — CLINICAL SUPPORT (OUTPATIENT)
Dept: REHABILITATION | Facility: HOSPITAL | Age: 40
End: 2020-04-30
Attending: ORTHOPAEDIC SURGERY
Payer: COMMERCIAL

## 2020-04-30 DIAGNOSIS — M25.60 RANGE OF MOTION DEFICIT: ICD-10-CM

## 2020-04-30 PROCEDURE — 97110 THERAPEUTIC EXERCISES: CPT

## 2020-04-30 NOTE — PROGRESS NOTES
Occupational Therapy Daily Treatment Note     Date: 4/30/2020  Name: Ryan Luna  Clinic Number: [id number]    Therapy Diagnosis:   Encounter Diagnosis   Name Primary?    Range of motion deficit      Physician: CATHY Ward MD  Physician Orders: Eval and treat per protocol     Medical Diagnosis: Right elbow distal biceps repair, volar single incision approach (CPT 50492)      Surgical Procedure and Date: 4/3/2020      Evaluation Date: 4/23/2020  [facility]  Insurance Authorization period Expiration: 12/31/2020      Plan of Care Certification Period: 4/23/2020 to 6/23/20     Visit # / Visits Authortized: 3/ 20  Time In:1:55 pm  Time Out: 2:35 pm  Total Billable Time:20  minutes     Precautions: Standard      Subjective     Pt reports: he was compliant with home exercise program given last session.   Response to previous treatment:more motion  Functional change: none    Pain: 0/10 at rest, 4/10 with exercises at times  Location: right elbow      Objective   Observation/Appearance:  Skin intact and steri-strips in intact.     Edema. Measured in centimeters.    4/23/2020 4/23/2020     Left Right   2in. Above elbow 30.8 30.8   2in. Below elbow 29 30   Elbow Crease 27 30.5         Wrist ROM  Date 4/23/2020 4/30/2020     Right Right   Elbow ext/flex 40/120  (active ext,passive flexion) 0/140*(active ext,passive flexion)   Supination/Pronation 50/80  (passive) 65/80  (passive)   Wrist ext/flex WNL WNL   Wrist RD/UD WNL WNL         Sensation:  Intact      Strength (Dyanmometer) and Pinch Strength (Pinch Gauge)  Measured in pounds and psi. Average of three trials.    4/23/2020 4/23/2020     Left Right   Rung II def def         Ryan received the following supervised modalities after being cleared for contradictions for 15 minutes:   -Patient received MH x 15 min to left elbow to increase blood flow, circulation and tissue elasticity prior to therex        Ryan received the  following manual therapy techniques for 5 minutes:   -STM to right elbow     Ryan received therapeutic exercises for 20 minutes including:  -Active/gentle passive ext of elbow, passive elbow flex, passive sup/pron, wrist ext/flex (elbow flexed at 90) and RD/UD (elbow flexed at 90) x 10 reps     Ryan participated in dynamic functional therapeutic activities to improve functional performance for 0  minutes, including:  -NT         Home Exercises and Education Provided     Education provided:   - none today  - Progress towards goals: Excellent    Written Home Exercises Provided: Patient instructed to cont prior HEP.  Exercises were reviewed and Ryan was able to demonstrate them prior to the end of the session.  Ryan demonstrated good  understanding of the education provided.     See EMR under Patient Instructions for exercises provided prior visit.     Ryan demonstrated good  understanding of the education provided.         Assessment   Ryan Luna is a 39 y.o. male referred to outpatient occupational/hand therapy and presents with a medical diagnosis of right distal biceps rupture, resulting in pain, edema, range of motion deficit and decreased strength in RUE. Pt's active elbow ext and passive elbow flex, pron/sup continues to improve.  Pt tolerated all exercises without difficulty.    Pt would continue to benefit from skilled OT.      Ryan is progressing well towards his goals and there are no updates to goals at this time. Pt prognosis is Excellent.     Pt will continue to benefit from skilled outpatient occupational therapy to address the deficits listed in the problem list on initial evaluation provide pt/family education and to maximize pt's level of independence in the home and community environment.     Anticipated barriers to occupational therapy: none    Pt's spiritual, cultural and educational needs considered and pt agreeable to plan of care and goals.    Goals:  Short Term (4 weeks on  5/23/2020):  1)   Patient to be IND with HEP and modalities for pain management. -progressing  2)   Increase ROM 15-20 degrees for elbow ext/flex and sup/pron to increase functional hand use for reaching objects.-progressing  3)   Measure  strength once post op 6 weeks.-progressing  4)   Decrease edema .2-.3 cm to increase joint mobility /flexibility for improved overall functional hand use. -progressing        Long Term (by discharge):  1)   Pt will report 0 out of 10 pain with RUE activities.-progressing  2)   Pt will return to prior level of function for ADLs and household management.-progressing          Plan   Certification Period/Plan of care expiration: 4/23/2020 to 6/23/2020.     Outpatient Occupational Therapy 1 time weekly for 8 weeks may include the following interventions: Manual therapy/joint mobilizations, Modalities for pain management, Therapeutic exercises/activities., Strengthening, Orthotic Fabrication/Fit/Training, Edema Control and Scar Management.          Discussed Plan of Care with patient: Yes  Updates/Grading for next session: Cont per protocol      Deborah Asif, OT

## 2020-05-07 ENCOUNTER — CLINICAL SUPPORT (OUTPATIENT)
Dept: REHABILITATION | Facility: HOSPITAL | Age: 40
End: 2020-05-07
Payer: COMMERCIAL

## 2020-05-07 DIAGNOSIS — M25.60 RANGE OF MOTION DEFICIT: ICD-10-CM

## 2020-05-07 PROCEDURE — 97110 THERAPEUTIC EXERCISES: CPT

## 2020-05-07 NOTE — PROGRESS NOTES
Occupational Therapy Daily Treatment Note     Date: 5/7/2020  Name: Ryan Luna  Clinic Number: [id number]    Therapy Diagnosis:   Encounter Diagnosis   Name Primary?    Range of motion deficit      Physician: CATHY Ward MD  Physician Orders: Eval and treat per protocol     Medical Diagnosis: Right elbow distal biceps repair, volar single incision approach (CPT 25713)      Surgical Procedure and Date: 4/3/2020      Evaluation Date: 4/23/2020  [facility]  Insurance Authorization period Expiration: 12/31/2020      Plan of Care Certification Period: 4/23/2020 to 6/23/20     Visit # / Visits Authortized: 4/ 20  Time In:11:00 am  Time Out: 11:40 am  Total Billable Time:20  minutes     Precautions: Standard      Subjective     Pt reports: he was compliant with home exercise program given last session.   Response to previous treatment:more motion  Functional change: none    Pain: 0/10 at rest, 4/10 with exercises at times  Location: right elbow      Objective   Observation/Appearance:  Skin intact and steri-strips in intact.     Edema. Measured in centimeters.    4/23/2020 4/23/2020     Left Right   2in. Above elbow 30.8 30.8   2in. Below elbow 29 30   Elbow Crease 27 30.5         Wrist ROM  Date 4/23/2020 4/30/2020     Right Right   Elbow ext/flex 40/120  (active ext,passive flexion) 0/140*(active ext,passive flexion)   Supination/Pronation 50/80  (passive) 65/80  (passive)   Wrist ext/flex WNL WNL   Wrist RD/UD WNL WNL         Sensation:  Intact      Strength (Dyanmometer) and Pinch Strength (Pinch Gauge)  Measured in pounds and psi. Average of three trials.    4/23/2020 4/23/2020     Left Right   Rung II def def         Ryan received the following supervised modalities after being cleared for contradictions for 15 minutes:   -Patient received MH x 15 min to left elbow to increase blood flow, circulation and tissue elasticity prior to therex        Ryan received the  following manual therapy techniques for 5 minutes:   -STM to right elbow     Ryan received therapeutic exercises for 20 minutes including:  -Active/gentle passive ext of elbow, passive elbow flex, passive sup/pron, wrist ext/flex (elbow flexed at 90) and RD/UD (elbow flexed at 90) x 10 reps     Ryan participated in dynamic functional therapeutic activities to improve functional performance for 0  minutes, including:  -NT         Home Exercises and Education Provided     Education provided:   - none today  - Progress towards goals: Excellent    Written Home Exercises Provided: Patient instructed to cont prior HEP.  Exercises were reviewed and Ryan was able to demonstrate them prior to the end of the session.  Ryan demonstrated good  understanding of the education provided.     See EMR under Patient Instructions for exercises provided prior visit.     Ryan demonstrated good  understanding of the education provided.         Assessment   Ryan Luna is a 39 y.o. male referred to outpatient occupational/hand therapy and presents with a medical diagnosis of right distal biceps rupture, resulting in pain, edema, range of motion deficit and decreased strength in RUE. Pt's active elbow ext and passive elbow flex, pron/sup continues to improve.  Pt tolerated all exercises without difficulty.    Pt would continue to benefit from skilled OT.      Ryan is progressing well towards his goals and there are no updates to goals at this time. Pt prognosis is Excellent.     Pt will continue to benefit from skilled outpatient occupational therapy to address the deficits listed in the problem list on initial evaluation provide pt/family education and to maximize pt's level of independence in the home and community environment.     Anticipated barriers to occupational therapy: none    Pt's spiritual, cultural and educational needs considered and pt agreeable to plan of care and goals.    Goals:  Short Term (4 weeks on  5/23/2020):  1)   Patient to be IND with HEP and modalities for pain management. -progressing  2)   Increase ROM 15-20 degrees for elbow ext/flex and sup/pron to increase functional hand use for reaching objects.-progressing  3)   Measure  strength once post op 6 weeks.-progressing  4)   Decrease edema .2-.3 cm to increase joint mobility /flexibility for improved overall functional hand use. -progressing        Long Term (by discharge):  1)   Pt will report 0 out of 10 pain with RUE activities.-progressing  2)   Pt will return to prior level of function for ADLs and household management.-progressing          Plan   Certification Period/Plan of care expiration: 4/23/2020 to 6/23/2020.     Outpatient Occupational Therapy 1 time weekly for 8 weeks may include the following interventions: Manual therapy/joint mobilizations, Modalities for pain management, Therapeutic exercises/activities., Strengthening, Orthotic Fabrication/Fit/Training, Edema Control and Scar Management.          Discussed Plan of Care with patient: Yes  Updates/Grading for next session: Advance per protocol next session.      Deborah Asif, OT

## 2020-05-14 ENCOUNTER — CLINICAL SUPPORT (OUTPATIENT)
Dept: REHABILITATION | Facility: HOSPITAL | Age: 40
End: 2020-05-14
Attending: ORTHOPAEDIC SURGERY
Payer: COMMERCIAL

## 2020-05-14 DIAGNOSIS — M25.60 RANGE OF MOTION DEFICIT: ICD-10-CM

## 2020-05-14 PROCEDURE — 97110 THERAPEUTIC EXERCISES: CPT

## 2020-05-14 NOTE — PROGRESS NOTES
Occupational Therapy Daily Treatment Note     Date: 5/14/2020  Name: Ryan Luna  Clinic Number: [id number]    Therapy Diagnosis:   Encounter Diagnosis   Name Primary?    Range of motion deficit      Physician: CATHY Ward MD  Physician Orders: Eval and treat per protocol     Medical Diagnosis: Right elbow distal biceps repair, volar single incision approach (CPT 89119)      Surgical Procedure and Date: 4/3/2020      Evaluation Date: 4/23/2020  [facility]  Insurance Authorization period Expiration: 12/31/2020      Plan of Care Certification Period: 4/23/2020 to 6/23/20     Visit # / Visits Authortized: 5/ 20  Time In:11:00 am  Time Out: 11:55 am  Total Billable Time:45  minutes     Precautions: Standard      Subjective     Pt reports: he was compliant with home exercise program given last session.   Response to previous treatment:more motion  Functional change: none    Pain: 0/10 at rest, 4/10 with exercises at times  Location: right elbow, occasional pain in wrist    Objective   Observation/Appearance:  Skin intact and steri-strips in intact.     Edema. Measured in centimeters.    4/23/2020 4/23/2020     Left Right   2in. Above elbow 30.8 30.8   2in. Below elbow 29 30   Elbow Crease 27 30.5         Wrist ROM  Date 4/23/2020 4/30/2020     Right Right   Elbow ext/flex 40/120  (active ext,passive flexion) 0/140*(active ext,passive flexion)   Supination/Pronation 50/80  (passive) 65/80  (passive)   Wrist ext/flex WNL WNL   Wrist RD/UD WNL WNL         Sensation:  Intact      Strength (Dyanmometer) and Pinch Strength (Pinch Gauge)  Measured in pounds and psi. Average of three trials.    4/23/2020 4/23/2020     Left Right   Rung II def def         Ryna received the following supervised modalities after being cleared for contradictions for 10 minutes:   -Patient received MH x 10 min to left elbow to increase blood flow, circulation and tissue elasticity prior to therex         Ryan received the following manual therapy techniques for 5 minutes:   -STM to right elbow     Ryan received therapeutic exercises for 40 minutes including:  -Active/gentle passive ext of elbow, Active elbow flex, passive sup/pron, wrist ext/flex (elbow flexed at 90) and RD/UD (elbow flexed at 90) x 10 reps  -table slides x 2 min  -supination wheel x 3 min  -theraband (orange)scapular strengthening, IR/ER, rows  -UBE, no resistance, forward, x 10 min     Ryan participated in dynamic functional therapeutic activities to improve functional performance for 0  minutes, including:  -NT         Home Exercises and Education Provided     Education provided:   - added AROM for elbow and forearm, added light scapular strengthening with orange band, including rows, IR/ER (demonstrated good technique as to not put tension through biceps)  - Progress towards goals: Excellent    Written Home Exercises Provided: Patient instructed to cont prior HEP.  Exercises were reviewed and Ryan was able to demonstrate them prior to the end of the session.  Ryan demonstrated good  understanding of the education provided.     See EMR under Patient Instructions for exercises provided prior visit.     Ryan demonstrated good  understanding of the education provided.         Assessment   Ryan Luna is a 39 y.o. male referred to outpatient occupational/hand therapy and presents with a medical diagnosis of right distal biceps rupture, resulting in pain, edema, range of motion deficit and decreased strength in RUE. Able to progress pt with AROM of elbow and forearm today, as well as light scapular strengthening. Pt demonstrates good AROM of elbow and forearm. Tightness noted with supination. Pt demonstrated good technique of new exercise. Discontinued brace today, but educated pt on still not lifting or putting resistance through biceps. Pt denied pain.   Pt tolerated all exercises without difficulty.    Pt would continue to benefit from  skilled JUDITH Davis is progressing well towards his goals and there are no updates to goals at this time. Pt prognosis is Excellent.     Pt will continue to benefit from skilled outpatient occupational therapy to address the deficits listed in the problem list on initial evaluation provide pt/family education and to maximize pt's level of independence in the home and community environment.     Anticipated barriers to occupational therapy: none    Pt's spiritual, cultural and educational needs considered and pt agreeable to plan of care and goals.    Goals:  Short Term (4 weeks on 5/23/2020):  1)   Patient to be IND with HEP and modalities for pain management. -progressing  2)   Increase ROM 15-20 degrees for elbow ext/flex and sup/pron to increase functional hand use for reaching objects.-progressing  3)   Measure  strength once post op 6 weeks.-progressing  4)   Decrease edema .2-.3 cm to increase joint mobility /flexibility for improved overall functional hand use. -progressing        Long Term (by discharge):  1)   Pt will report 0 out of 10 pain with RUE activities.-progressing  2)   Pt will return to prior level of function for ADLs and household management.-progressing          Plan   Certification Period/Plan of care expiration: 4/23/2020 to 6/23/2020.     Outpatient Occupational Therapy 1 time weekly for 8 weeks may include the following interventions: Manual therapy/joint mobilizations, Modalities for pain management, Therapeutic exercises/activities., Strengthening, Orthotic Fabrication/Fit/Training, Edema Control and Scar Management.          Discussed Plan of Care with patient: Yes  Updates/Grading for next session: Advance per protocol next session.      Liseth Singh, OT

## 2020-05-14 NOTE — PATIENT INSTRUCTIONS
[facility], OCCUPATIONAL THERAPY  HOME EXERCISE PROGRAM     Complete the following exercises with 2 x 10 repetitions each, 3-4x/day.     AROM: Elbow Flexion / Extension          Bend and straighten elbow in 3 different positions: thumb up, palm up, palm down.      AROM: Supination / Pronation   With your elbow by your side, turn your palm up then turn your palm down.                                Copyright © I. All rights reserved.     Therapist: ITALO Rodriguez

## 2020-05-21 ENCOUNTER — OFFICE VISIT (OUTPATIENT)
Dept: SPORTS MEDICINE | Facility: CLINIC | Age: 40
End: 2020-05-21
Payer: COMMERCIAL

## 2020-05-21 VITALS
HEIGHT: 69 IN | BODY MASS INDEX: 29.77 KG/M2 | WEIGHT: 201 LBS | SYSTOLIC BLOOD PRESSURE: 158 MMHG | DIASTOLIC BLOOD PRESSURE: 107 MMHG | HEART RATE: 88 BPM

## 2020-05-21 DIAGNOSIS — Z47.89 SURGICAL AFTERCARE, MUSCULOSKELETAL SYSTEM: Primary | ICD-10-CM

## 2020-05-21 PROCEDURE — 99024 PR POST-OP FOLLOW-UP VISIT: ICD-10-PCS | Mod: S$GLB,,, | Performed by: ORTHOPAEDIC SURGERY

## 2020-05-21 PROCEDURE — 99999 PR PBB SHADOW E&M-EST. PATIENT-LVL III: CPT | Mod: PBBFAC,,, | Performed by: ORTHOPAEDIC SURGERY

## 2020-05-21 PROCEDURE — 99999 PR PBB SHADOW E&M-EST. PATIENT-LVL III: ICD-10-PCS | Mod: PBBFAC,,, | Performed by: ORTHOPAEDIC SURGERY

## 2020-05-21 PROCEDURE — 99024 POSTOP FOLLOW-UP VISIT: CPT | Mod: S$GLB,,, | Performed by: ORTHOPAEDIC SURGERY

## 2020-05-26 ENCOUNTER — CLINICAL SUPPORT (OUTPATIENT)
Dept: REHABILITATION | Facility: HOSPITAL | Age: 40
End: 2020-05-26
Payer: COMMERCIAL

## 2020-05-26 DIAGNOSIS — M25.60 RANGE OF MOTION DEFICIT: ICD-10-CM

## 2020-05-26 PROCEDURE — 97110 THERAPEUTIC EXERCISES: CPT

## 2020-05-26 NOTE — PROGRESS NOTES
Occupational Therapy Daily Treatment Note     Date: 5/26/2020  Name: Ryan Luna  Clinic Number: [id number]    Therapy Diagnosis:   Encounter Diagnosis   Name Primary?    Range of motion deficit      Physician: CATHY Ward MD  Physician Orders: Eval and treat per protocol     Medical Diagnosis: Right elbow distal biceps repair, volar single incision approach (CPT 62401)      Surgical Procedure and Date: 4/3/2020      Evaluation Date: 4/23/2020  [facility]  Insurance Authorization period Expiration: 12/31/2020      Plan of Care Certification Period: 4/23/2020 to 6/23/20     Visit # / Visits Authortized: 6/ 20  Time In:2:30 pm  Time Out: 3:15 pm  Total Billable Time:30  minutes     Precautions: Standard      Subjective     Pt reports: he was compliant with home exercise program given last session.   Response to previous treatment:more motion  Functional change: using arm for light activities    Pain: 0/10 at rest, 4/10 with exercises at times  Location: right elbow, occasional pain in wrist    Objective   Observation/Appearance:  Skin intact and steri-strips in intact.     Edema. Measured in centimeters.    4/23/2020 4/23/2020 5/26/2020     Left Right Right   2in. Above elbow 30.8 30.8 30.8   2in. Below elbow 29 30 30   Elbow Crease 27 30.5 28         Wrist ROM  Date 4/23/2020 4/30/2020 5/26/2020     Right Right Right   Elbow ext/flex 40/120  (active ext,passive flexion) 0/140*(active ext,passive flexion) 0/140 (active)   Supination/Pronation 50/80  (passive) 65/80  (passive) 65/80  (activie)   Wrist ext/flex WNL WNL WNL   Wrist RD/UD WNL WNL WNL         Sensation:  Intact      Strength (Dyanmometer) and Pinch Strength (Pinch Gauge)  Measured in pounds and psi. Average of three trials.    4/23/2020 4/23/2020 5/26/2020 5/26/2020     Left Right Left Right   Rung II def def 90 75         Ryan received the following supervised modalities after being cleared for  contradictions for 10 minutes:   -Patient received MH x 10 min to left elbow to increase blood flow, circulation and tissue elasticity prior to therex        Ryan received the following manual therapy techniques for 5 minutes:   -STM to right elbow     Ryan received therapeutic exercises for 30 minutes including:  -Active/gentle passive ext of elbow, Active elbow flex, passive sup/pron, wrist ext/flex (elbow flexed at 90) and RD/UD (elbow flexed at 90) x 10 reps  -theraband (green)scapular strengthening, IR/ER, rows, biceps curl, triceps ext and supination  x 15 reps  -UBE, level 2, forward 5 min, reverse 5 min  -Wrist 3 ways with 2 lb 2 x 15 reps       Ryan participated in dynamic functional therapeutic activities to improve functional performance for 0  minutes, including:  -NT         Home Exercises and Education Provided     Education provided:   -  Green theraband and blue putty  - Progress towards goals: Excellent    Written Home Exercises Provided: Patient instructed to cont prior HEP and today's HEP  Exercises were reviewed and Ryan was able to demonstrate them prior to the end of the session.  Ryan demonstrated good  understanding of the education provided.     See EMR under Patient Instructions for exercises provided prior visit.     Ryan demonstrated good  understanding of the education provided.         Assessment   Ryan Luna is a 39 y.o. male referred to outpatient occupational/hand therapy and presents with a medical diagnosis of right distal biceps rupture, resulting in pain, edema, range of motion deficit and decreased strength in RUE. Able to progress pt with light strengthening of elbow and wrist today, as well as light scapular strengthening. Measured  strength and AROM today. Pt is using his right arm for light activities without difficulty.    Pt would continue to benefit from skilled OT.      Ryan is progressing well towards his goals and there are no updates to goals at this  time. Pt prognosis is Excellent.     Pt will continue to benefit from skilled outpatient occupational therapy to address the deficits listed in the problem list on initial evaluation provide pt/family education and to maximize pt's level of independence in the home and community environment.     Anticipated barriers to occupational therapy: none    Pt's spiritual, cultural and educational needs considered and pt agreeable to plan of care and goals.    Goals:  Short Term (4 weeks on 5/23/2020):  1)   Patient to be IND with HEP and modalities for pain management. -Met 5/26/2020  2)   Increase ROM 15-20 degrees for elbow ext/flex and sup/pron to increase functional hand use for reaching objects. - Met 4/30/2020  3)   Measure  strength once post op 6 weeks.-Met 5/26/2020  4)   Decrease edema .2-.3 cm to increase joint mobility /flexibility for improved overall functional hand use. - Met 5/26/2020        Long Term (by discharge):  1)   Pt will report 0 out of 10 pain with RUE activities.-progressing  2)   Pt will return to prior level of function for ADLs and household management.-progressing          Plan   Certification Period/Plan of care expiration: 4/23/2020 to 6/23/2020.     Outpatient Occupational Therapy 1 time weekly for 8 weeks may include the following interventions: Manual therapy/joint mobilizations, Modalities for pain management, Therapeutic exercises/activities., Strengthening, Orthotic Fabrication/Fit/Training, Edema Control and Scar Management.          Discussed Plan of Care with patient: Yes  Updates/Grading for next session: Advance per protocol next session.      Deborah Asif, OT

## 2020-05-26 NOTE — PATIENT INSTRUCTIONS
[facility]  OCCUPATIONAL THERAPY  HOME EXERCISE PROGRAM     Complete the following strengthening exercises using green theraband  weight.  Do 15 repetitions of each, 2 x per day.     Resisted Forearm Rotation  Use a weight or a hammer. Slowly rotate hand to one side then the other.      Resisted Wrist Flexion  With palm up and weight in hand, bend wrist up. Return slowly.      Resisted Wrist Extension  With palm down and weight in hand, bend wrist up. Then bend wrist down.      Resisted Wrist Deviation  With thumb up and weight in hand, bend wrist up. Return slowly.    Copyright © I. All rights reserved.     Therapist: ITALO Townsend CHT   Elbow Extension: Resisted        With tubing wrapped around left fist and other end anchored, straighten elbow.  Repeat 15____ times per set. Do ___1_ set per session. Do __2__ sessions per day.    Elbow Flexion: Resisted        With tubing wrapped around left fist and other end secured under foot, curl arm up as far as possible.  Repeat __15__ times per set. Do _1___ set per session. Do _2___ sessions per day.    Copyright © I. All rights reserved.     Blue putty grasping x 3 min 2 x daily

## 2020-06-02 ENCOUNTER — CLINICAL SUPPORT (OUTPATIENT)
Dept: REHABILITATION | Facility: HOSPITAL | Age: 40
End: 2020-06-02
Payer: COMMERCIAL

## 2020-06-02 DIAGNOSIS — M25.60 RANGE OF MOTION DEFICIT: ICD-10-CM

## 2020-06-02 PROCEDURE — 97110 THERAPEUTIC EXERCISES: CPT

## 2020-06-02 NOTE — PROGRESS NOTES
Occupational Therapy Daily Treatment Note     Date: 6/2/2020  Name: Ryan Luna  Clinic Number: [id number]    Therapy Diagnosis:   Encounter Diagnosis   Name Primary?    Range of motion deficit      Physician: CATHY Ward MD  Physician Orders: Eval and treat per protocol     Medical Diagnosis: Right elbow distal biceps repair, volar single incision approach (CPT 07231)      Surgical Procedure and Date: 4/3/2020      Evaluation Date: 4/23/2020  [facility]  Insurance Authorization period Expiration: 12/31/2020      Plan of Care Certification Period: 4/23/2020 to 6/23/20     Visit # / Visits Authortized: 7/ 20  Time In:1:05 pm  Time Out: 1:45 pm  Total Billable Time:40  minutes     Precautions: Standard      Subjective     Pt reports: he was compliant with home exercise program given last session.   Response to previous treatment:more motion  Functional change: using arm for light activities    Pain: 0/10 at rest, 2/10 with exercises at times  Location: right elbow, occasional pain in wrist    Objective   Observation/Appearance:  Skin intact.     Edema. Measured in centimeters.    4/23/2020 4/23/2020 5/26/2020     Left Right Right   2in. Above elbow 30.8 30.8 30.8   2in. Below elbow 29 30 30   Elbow Crease 27 30.5 28         Wrist ROM  Date 4/23/2020 4/30/2020 5/26/2020     Right Right Right   Elbow ext/flex 40/120  (active ext,passive flexion) 0/140*(active ext,passive flexion) 0/140 (active)   Supination/Pronation 50/80  (passive) 65/80  (passive) 65/80  (active)   Wrist ext/flex WNL WNL WNL   Wrist RD/UD WNL WNL WNL         Sensation:  Intact      Strength (Dyanmometer) and Pinch Strength (Pinch Gauge)  Measured in pounds and psi. Average of three trials.    4/23/2020 4/23/2020 5/26/2020 5/26/2020     Left Right Left Right   Rung II def def 90 75        Ryan received therapeutic exercises for 40 minutes including:  -theraband (blue)scapular strengthening, IR/ER, rows,  biceps curl 3 ways, triceps ext and supination  2 x 15 reps  -UBE, level 3, forward 5 min, reverse 5 min  -Wrist 3 ways with 3 lb 2 x 15 reps  -hammer x 30 reps for rotation  -rebounder red ball x 50 reps     Ryan participated in dynamic functional therapeutic activities to improve functional performance for 0  minutes, including:  -NT         Home Exercises and Education Provided     Education provided:   -  Blue theraband   - Progress towards goals: Excellent    Written Home Exercises Provided: Patient instructed to cont prior HEP and today's HEP  Exercises were reviewed and Ryan was able to demonstrate them prior to the end of the session.  Ryan demonstrated good  understanding of the education provided.     See EMR under Patient Instructions for exercises provided prior visit.     Ryan demonstrated good  understanding of the education provided.         Assessment   Ryan Luna is a 39 y.o. male referred to outpatient occupational/hand therapy and presents with a medical diagnosis of right distal biceps rupture, resulting in pain, edema, range of motion deficit and decreased strength in RUE. Advanced strengthening exercises today with no difficulty. Pain has decreased in his right arm.  He only complains of minimal pain after his exercises.    Pt would continue to benefit from skilled OT.      Ryan is progressing well towards his goals and there are no updates to goals at this time. Pt prognosis is Excellent.     Pt will continue to benefit from skilled outpatient occupational therapy to address the deficits listed in the problem list on initial evaluation provide pt/family education and to maximize pt's level of independence in the home and community environment.     Anticipated barriers to occupational therapy: none    Pt's spiritual, cultural and educational needs considered and pt agreeable to plan of care and goals.    Goals:  Short Term (4 weeks on 5/23/2020):  1)   Patient to be IND with HEP and  modalities for pain management. -Met 5/26/2020  2)   Increase ROM 15-20 degrees for elbow ext/flex and sup/pron to increase functional hand use for reaching objects. - Met 4/30/2020  3)   Measure  strength once post op 6 weeks.-Met 5/26/2020  4)   Decrease edema .2-.3 cm to increase joint mobility /flexibility for improved overall functional hand use. - Met 5/26/2020        Long Term (by discharge):  1)   Pt will report 0 out of 10 pain with RUE activities.-progressing  2)   Pt will return to prior level of function for ADLs and household management.-progressing          Plan   Certification Period/Plan of care expiration: 4/23/2020 to 6/23/2020.     Outpatient Occupational Therapy 1 time weekly for 8 weeks may include the following interventions: Manual therapy/joint mobilizations, Modalities for pain management, Therapeutic exercises/activities., Strengthening, Orthotic Fabrication/Fit/Training, Edema Control and Scar Management.          Discussed Plan of Care with patient: Yes  Updates/Grading for next session: Advance per protocol next session.      Deborah Asif, OT

## 2020-06-09 ENCOUNTER — CLINICAL SUPPORT (OUTPATIENT)
Dept: REHABILITATION | Facility: HOSPITAL | Age: 40
End: 2020-06-09
Payer: COMMERCIAL

## 2020-06-09 DIAGNOSIS — M25.60 RANGE OF MOTION DEFICIT: ICD-10-CM

## 2020-06-09 PROCEDURE — 97110 THERAPEUTIC EXERCISES: CPT

## 2020-06-09 NOTE — PROGRESS NOTES
Occupational Therapy Daily Treatment Note     Date: 6/9/2020  Name: Ryan Luna  Clinic Number: [id number]    Therapy Diagnosis:   Encounter Diagnosis   Name Primary?    Range of motion deficit      Physician: CATHY Ward MD  Physician Orders: Eval and treat per protocol     Medical Diagnosis: Right elbow distal biceps repair, volar single incision approach (CPT 76912)      Surgical Procedure and Date: 4/3/2020      Evaluation Date: 4/23/2020  Insurance: [facility]  Insurance Authorization period Expiration: 12/31/2020      Plan of Care Certification Period: 4/23/2020 to 6/23/20     Visit # / Visits Authortized: 7/ 20  Time In:1:00 pm  Time Out: 1:30 pm  Total Billable Time:30  minutes     Precautions: Standard      Subjective     Pt reports: he was compliant with home exercise program given last session.   Response to previous treatment:more motion  Functional change: using arm for light activities    Pain: 0/10 at rest, 2/10 with exercises at times  Location: right elbow, occasional pain in wrist    Objective   Observation/Appearance:  Skin intact.     Edema. Measured in centimeters.    4/23/2020 4/23/2020 5/26/2020     Left Right Right   2in. Above elbow 30.8 30.8 30.8   2in. Below elbow 29 30 30   Elbow Crease 27 30.5 28         Wrist ROM  Date 4/23/2020 4/30/2020 5/26/2020     Right Right Right   Elbow ext/flex 40/120  (active ext,passive flexion) 0/140*(active ext,passive flexion) 0/140 (active)   Supination/Pronation 50/80  (passive) 65/80  (passive) 65/80  (active)   Wrist ext/flex WNL WNL WNL   Wrist RD/UD WNL WNL WNL         Sensation:  Intact      Strength (Dyanmometer) and Pinch Strength (Pinch Gauge)  Measured in pounds and psi. Average of three trials.    4/23/2020 4/23/2020 5/26/2020 5/26/2020     Left Right Left Right   Rung II def def 90 75        Ryan received therapeutic exercises for 40 minutes including:  -theraband (maroon) scapular strengthening, IR/ER,  rows, biceps curl 3 ways, triceps ext and supination  2 x 15 reps  -UBE, level 4, forward 5 min, reverse 5 min  -Wrist 3 ways with 4 lb 2 x 15 reps  -hammer 1 1/2 lb x 30 reps for rotation  -rebounder 2.0 kg ball x 50 reps  -gripper level 4 x 30 reps     Ryan participated in dynamic functional therapeutic activities to improve functional performance for 0  minutes, including:  -NT         Home Exercises and Education Provided     Education provided:   -  Amanda craig   - Progress towards goals: Excellent    Written Home Exercises Provided: Patient instructed to cont prior HEP and today's HEP  Exercises were reviewed and Ryan was able to demonstrate them prior to the end of the session.  Ryan demonstrated good  understanding of the education provided.     See EMR under Patient Instructions for exercises provided prior visit.     Ryan demonstrated good  understanding of the education provided.         Assessment   Ryan Luna is a 39 y.o. male referred to outpatient occupational/hand therapy and presents with a medical diagnosis of right distal biceps rupture, resulting in pain, edema, range of motion deficit and decreased strength in RUE. Advanced strengthening exercises today with no difficulty.    Pt would continue to benefit from skilled OT.      Ryan is progressing well towards his goals and there are no updates to goals at this time. Pt prognosis is Excellent.     Pt will continue to benefit from skilled outpatient occupational therapy to address the deficits listed in the problem list on initial evaluation provide pt/family education and to maximize pt's level of independence in the home and community environment.     Anticipated barriers to occupational therapy: none    Pt's spiritual, cultural and educational needs considered and pt agreeable to plan of care and goals.    Goals:  Short Term (4 weeks on 5/23/2020):  1)   Patient to be IND with HEP and modalities for pain management. -Met  5/26/2020  2)   Increase ROM 15-20 degrees for elbow ext/flex and sup/pron to increase functional hand use for reaching objects. - Met 4/30/2020  3)   Measure  strength once post op 6 weeks.-Met 5/26/2020  4)   Decrease edema .2-.3 cm to increase joint mobility /flexibility for improved overall functional hand use. - Met 5/26/2020        Long Term (by discharge):  1)   Pt will report 0 out of 10 pain with RUE activities.-progressing  2)   Pt will return to prior level of function for ADLs and household management.-progressing          Plan   Certification Period/Plan of care expiration: 4/23/2020 to 6/23/2020.     Outpatient Occupational Therapy 1 time weekly for 8 weeks may include the following interventions: Manual therapy/joint mobilizations, Modalities for pain management, Therapeutic exercises/activities., Strengthening, Orthotic Fabrication/Fit/Training, Edema Control and Scar Management.          Discussed Plan of Care with patient: Yes  Updates/Grading for next session: Advance per protocol next session.      Deborah Asif, OT

## 2020-07-01 ENCOUNTER — CLINICAL SUPPORT (OUTPATIENT)
Dept: REHABILITATION | Facility: HOSPITAL | Age: 40
End: 2020-07-01
Attending: ORTHOPAEDIC SURGERY
Payer: COMMERCIAL

## 2020-07-01 DIAGNOSIS — M25.60 RANGE OF MOTION DEFICIT: ICD-10-CM

## 2020-07-01 PROCEDURE — 97110 THERAPEUTIC EXERCISES: CPT

## 2020-07-01 NOTE — PROGRESS NOTES
Occupational Therapy Discharge Note     Date: 7/1/2020  Name: Ryan Luna  Clinic Number: [id number]    Therapy Diagnosis:   Encounter Diagnosis   Name Primary?    Range of motion deficit      Physician: CATHY Ward MD  Physician Orders: Eval and treat per protocol     Medical Diagnosis: Right elbow distal biceps repair, volar single incision approach (CPT 49514)      Surgical Procedure and Date: 4/3/2020      Evaluation Date: 4/23/2020  Insurance: [employer]  Insurance Authorization period Expiration: 12/31/2020      Plan of Care Certification Period: 6/23/2020 to 7/23/2020     Visit # / Visits Authortized: 8/ 20  Time In:3:15 pm  Time Out: 3:55 pm  Total Billable Time: 40  minutes     Precautions: Standard      Subjective     Pt reports: he was compliant with home exercise program given last session.   Response to previous treatment:more motion  Functional change: using arm for light activities    Pain: 0/10 at rest  Location: right elbow, occasional pain in wrist    Objective   Observation/Appearance:  Skin intact.     Edema. Measured in centimeters.    4/23/2020 4/23/2020 5/26/2020 7/1/2020     Left Right Right Right   2in. Above elbow 30.8 30.8 30.8 30.8   2in. Below elbow 29 30 30 30   Elbow Crease 27 30.5 28 28         Wrist ROM  Date 4/23/2020 4/30/2020 5/26/2020 7/1/2020     Right Right Right Right   Elbow ext/flex 40/120  (active ext,passive flexion) 0/140*(active ext,passive flexion) 0/140 (active) WNL   Supination/Pronation 50/80  (passive) 65/80  (passive) 65/80  (active) WNL   Wrist ext/flex WNL WNL WNL WNL   Wrist RD/UD WNL WNL WNL WNL         Sensation:  Intact      Strength (Dyanmometer) and Pinch Strength (Pinch Gauge)  Measured in pounds and psi. Average of three trials.    4/23/2020 4/23/2020 5/26/2020 5/26/2020 7/1/2020     Left Right Left Right Right   Rung II def def 90 75 115        Ryan received therapeutic exercises for 40 minutes including:  -theraband  grey scapular strengthening, IR/ER, rows, biceps curl 3 ways, triceps ext and supination  2 x 15 reps  -UBE, level 5, forward 5 min, reverse 5 min  -Wrist  3 ways with 5 lb 2 x 15 reps  - Elbow ext/flex with 5 lb 2 x 15 reps  -hammer 2.5 lb x 30 reps for rotation  -rebounder green ball x 50 reps  -gripper level 5 x 30 reps     Ryan participated in dynamic functional therapeutic activities to improve functional performance for 0  minutes, including:  -NT         Home Exercises and Education Provided     Education provided:   -  Grey therabancassandra   - Progress towards goals: Excellent    Written Home Exercises Provided: Patient instructed to cont prior HEP and today's HEP  Exercises were reviewed and Ryan was able to demonstrate them prior to the end of the session.  Ryan demonstrated good  understanding of the education provided.     See EMR under Patient Instructions for exercises provided prior visit.     Ryan demonstrated good  understanding of the education provided.         Assessment   Ryan Luna is a 39 y.o. male referred to outpatient occupational/hand therapy and presents with a medical diagnosis of right distal biceps rupture, resulting in pain, edema, range of motion deficit and decreased strength in RUE. Advanced strengthening exercises today with no difficulty. Pt has met all goals.     Ryan is progressing well towards his goals and there are no updates to goals at this time. Pt prognosis is Excellent.     Anticipated barriers to occupational therapy: none    Pt's spiritual, cultural and educational needs considered and pt agreeable to plan of care and goals.    Goals:  Short Term (4 weeks on 5/23/2020):  1)   Patient to be IND with HEP and modalities for pain management. -Met 5/26/2020  2)   Increase ROM 15-20 degrees for elbow ext/flex and sup/pron to increase functional hand use for reaching objects. - Met 4/30/2020  3)   Measure  strength once post op 6 weeks.-Met 5/26/2020  4)   Decrease  edema .2-.3 cm to increase joint mobility /flexibility for improved overall functional hand use. - Met 5/26/2020        Long Term (by discharge):  1)   Pt will report 0 out of 10 pain with RUE activities.- Met 7/1/2020  2)   Pt will return to prior level of function for ADLs and household management.- Met 7/1/2020          Plan: Consult with physician.  Discharge from OT.   Certification Period/Plan of care expiration: 6/23/2020 to 7/23/2020.     Outpatient Occupational Therapy 1 time weekly for 8 weeks may include the following interventions: Manual therapy/joint mobilizations, Modalities for pain management, Therapeutic exercises/activities., Strengthening, Orthotic Fabrication/Fit/Training, Edema Control and Scar Management.          Discussed Plan of Care with patient: Yes  Updates/Grading for next session: None      Deborah Asif, OT

## 2020-07-22 NOTE — PROGRESS NOTES
"CC: RIGHT elbow follow up    PROCEDURES PERFORMED:    Right elbow distal biceps repair, volar single incision approach , on 4/3/2020       40 y.o. Male presents nearly 4 months out from surgery.  States the elbow is doing well.  No pain.  No complaints.  Physical therapy has gone well.  Here for scheduled follow-up.    PAST MEDICAL HISTORY:   Past Medical History:   Diagnosis Date    Hypertension      PAST SURGICAL HISTORY:  Past Surgical History:   Procedure Laterality Date    ELBOW SURGERY       FAMILY HISTORY:  No family history on file.    MEDICATIONS:    Current Outpatient Medications:     losartan (COZAAR) 50 MG tablet, Take 1 tablet (50 mg total) by mouth once daily., Disp: 90 tablet, Rfl: 3    ondansetron (ZOFRAN) 4 MG tablet, Take 1 tablet (4 mg total) by mouth every 8 (eight) hours as needed for Nausea., Disp: 30 tablet, Rfl: 0    ALLERGIES:  Review of patient's allergies indicates:  No Known Allergies    REVIEW OF SYSTEMS:  Constitution: Negative. Negative for chills, fever and night sweats.    Hematologic/Lymphatic: Negative for bleeding problem. Does not bruise/bleed easily.   Skin: Negative for dry skin, itching and rash.   Musculoskeletal: Negative for falls.  Negative for right elbow pain and muscle weakness.     All other review of symptoms were reviewed and found to be noncontributory.     PHYSICAL EXAMINATION:  Vitals:  BP (!) 149/98   Pulse 103   Ht 5' 9" (1.753 m)   Wt 91.2 kg (201 lb)   BMI 29.68 kg/m²    General: Well-developed well-nourished 40 y.o. malein no acute distress   Cardiovascular: Regular rhythm by palpation of distal pulse, normal color and temperature, no concerning varicosities on symptomatic side   Lungs: No labored breathing or wheezing appreciated   Neuro: Alert and oriented ×3   Psychiatric: well oriented to person, place and time, demonstrates normal mood and affect   Skin: No rashes, lesions or ulcers, normal temperature, turgor, and texture on uninvolved " extremity    Ortho/SPM Exam  Exam of the right elbow shows a well-healed volar incision.  No scar hypertrophy.  Nontender.  Full elbow flexion and extension without pain.  No pain with resisted flexion, 5/5.  Full forearm pronation and supination.  No pain on resisted supination.  Intact biceps hook test.  Nontender.    IMAGING:  Prior radiographs of the right elbow demonstrate evidence of distal biceps repair.  Cortical button intact at appropriate position.  No evidence of heterotopic ossification    ASSESSMENT:      ICD-10-CM ICD-9-CM   1. Rupture of right distal biceps tendon, subsequent encounter  S46.211D V58.89     841.8     S/p repair    PLAN:     The patient is nearly 4 months out from surgery.  Clinically healed.  Discussed progressing activities to tolerance at this point.  Build up strength and endurance.  Low weight high rep from in the gym.  No high risk activity for 1 more month.  Otherwise cleared to return to full duty at work.  May return to clinic as needed.    Procedures

## 2020-07-23 ENCOUNTER — OFFICE VISIT (OUTPATIENT)
Dept: SPORTS MEDICINE | Facility: CLINIC | Age: 40
End: 2020-07-23
Payer: COMMERCIAL

## 2020-07-23 VITALS
WEIGHT: 201 LBS | SYSTOLIC BLOOD PRESSURE: 149 MMHG | DIASTOLIC BLOOD PRESSURE: 98 MMHG | BODY MASS INDEX: 29.77 KG/M2 | HEART RATE: 103 BPM | HEIGHT: 69 IN

## 2020-07-23 DIAGNOSIS — S46.211D RUPTURE OF RIGHT DISTAL BICEPS TENDON, SUBSEQUENT ENCOUNTER: Primary | ICD-10-CM

## 2020-07-23 PROCEDURE — 99999 PR PBB SHADOW E&M-EST. PATIENT-LVL III: ICD-10-PCS | Mod: PBBFAC,,, | Performed by: ORTHOPAEDIC SURGERY

## 2020-07-23 PROCEDURE — 99999 PR PBB SHADOW E&M-EST. PATIENT-LVL III: CPT | Mod: PBBFAC,,, | Performed by: ORTHOPAEDIC SURGERY

## 2020-07-23 PROCEDURE — 99213 PR OFFICE/OUTPT VISIT, EST, LEVL III, 20-29 MIN: ICD-10-PCS | Mod: S$GLB,,, | Performed by: ORTHOPAEDIC SURGERY

## 2020-07-23 PROCEDURE — 99213 OFFICE O/P EST LOW 20 MIN: CPT | Mod: S$GLB,,, | Performed by: ORTHOPAEDIC SURGERY

## 2020-08-11 NOTE — PROGRESS NOTES
S:Ryan Luna presents for post-operative evaluation.     PROCEDURES PERFORMED:    Right elbow distal biceps repair, volar single incision approach    Ryan Luna reports to be doing well 6wk s/p the above mentioned procedure.  Denies any significant pain.  Doing well.  No numbness tingling.  Has now come out of the splint.  Remains in occupational therapy.    O:  Exam of the right elbow shows a well-healed incision.  Full sensation intact to light touch over the lateral antebrachial cutaneous nerve distribution.  Palpably intact distal biceps repair.  Intact took test.  Full elbow flexion, near full extension.  Full pronation.  10° short of full forearm supination.    Prior radiographs of the right elbow demonstrate evidence of distal biceps repair.  Cortical button intact at appropriate position.  No evidence of heterotopic ossification    A/P:  The patient is making appropriate progress.  Continue current exercises according to the therapy guidance.  More global strengthening phase begins at 12 weeks postop.  Given what I see at this point, I am expecting a full release closer to the 16 week ankita.  Paperwork provided to the patient stating this.  
71

## 2021-03-22 DIAGNOSIS — I10 HYPERTENSION, ESSENTIAL: ICD-10-CM

## 2021-03-22 RX ORDER — LOSARTAN POTASSIUM 50 MG/1
TABLET ORAL
Qty: 30 TABLET | Refills: 0 | Status: SHIPPED | OUTPATIENT
Start: 2021-03-22 | End: 2021-04-19

## 2021-04-19 ENCOUNTER — PATIENT MESSAGE (OUTPATIENT)
Dept: INTERNAL MEDICINE | Facility: CLINIC | Age: 41
End: 2021-04-19

## 2021-04-19 DIAGNOSIS — I10 HYPERTENSION, ESSENTIAL: ICD-10-CM

## 2021-04-19 RX ORDER — LOSARTAN POTASSIUM 50 MG/1
TABLET ORAL
Qty: 30 TABLET | Refills: 0 | Status: SHIPPED | OUTPATIENT
Start: 2021-04-19 | End: 2021-05-21

## 2021-05-21 DIAGNOSIS — I10 HYPERTENSION, ESSENTIAL: ICD-10-CM

## 2021-05-21 RX ORDER — LOSARTAN POTASSIUM 50 MG/1
TABLET ORAL
Qty: 30 TABLET | Refills: 0 | Status: SHIPPED | OUTPATIENT
Start: 2021-05-21 | End: 2021-06-21

## 2021-06-21 ENCOUNTER — PATIENT MESSAGE (OUTPATIENT)
Dept: INTERNAL MEDICINE | Facility: CLINIC | Age: 41
End: 2021-06-21

## 2021-06-21 DIAGNOSIS — I10 HYPERTENSION, ESSENTIAL: ICD-10-CM

## 2021-06-21 RX ORDER — LOSARTAN POTASSIUM 50 MG/1
TABLET ORAL
Qty: 30 TABLET | Refills: 0 | Status: SHIPPED | OUTPATIENT
Start: 2021-06-21 | End: 2022-01-25

## 2021-07-02 ENCOUNTER — PATIENT OUTREACH (OUTPATIENT)
Dept: ADMINISTRATIVE | Facility: HOSPITAL | Age: 41
End: 2021-07-02

## 2021-07-02 ENCOUNTER — PATIENT MESSAGE (OUTPATIENT)
Dept: ADMINISTRATIVE | Facility: HOSPITAL | Age: 41
End: 2021-07-02

## 2021-07-06 ENCOUNTER — PATIENT MESSAGE (OUTPATIENT)
Dept: ADMINISTRATIVE | Facility: HOSPITAL | Age: 41
End: 2021-07-06

## 2021-08-04 DIAGNOSIS — E53.8 B12 DEFICIENCY: ICD-10-CM

## 2021-08-04 DIAGNOSIS — C61 MALIGNANT NEOPLASM OF PROSTATE: ICD-10-CM

## 2021-08-04 DIAGNOSIS — E55.9 VITAMIN D DEFICIENCY DISEASE: ICD-10-CM

## 2021-08-04 DIAGNOSIS — E03.9 PRIMARY HYPOTHYROIDISM: ICD-10-CM

## 2021-08-04 DIAGNOSIS — E11.9 DIABETES MELLITUS WITHOUT COMPLICATION: ICD-10-CM

## 2021-08-04 DIAGNOSIS — E78.5 HYPERLIPIDEMIA, UNSPECIFIED HYPERLIPIDEMIA TYPE: Primary | ICD-10-CM

## 2021-08-04 DIAGNOSIS — I10 ESSENTIAL HYPERTENSION, MALIGNANT: ICD-10-CM

## 2021-10-05 ENCOUNTER — PATIENT MESSAGE (OUTPATIENT)
Dept: ADMINISTRATIVE | Facility: HOSPITAL | Age: 41
End: 2021-10-05

## 2022-01-26 ENCOUNTER — PATIENT MESSAGE (OUTPATIENT)
Dept: ADMINISTRATIVE | Facility: HOSPITAL | Age: 42
End: 2022-01-26
Payer: COMMERCIAL

## 2022-10-07 ENCOUNTER — OFFICE VISIT (OUTPATIENT)
Dept: PRIMARY CARE CLINIC | Facility: CLINIC | Age: 42
End: 2022-10-07
Payer: COMMERCIAL

## 2022-10-07 VITALS
HEIGHT: 69 IN | RESPIRATION RATE: 18 BRPM | SYSTOLIC BLOOD PRESSURE: 134 MMHG | TEMPERATURE: 98 F | OXYGEN SATURATION: 99 % | DIASTOLIC BLOOD PRESSURE: 88 MMHG | HEART RATE: 86 BPM | WEIGHT: 189.63 LBS | BODY MASS INDEX: 28.08 KG/M2

## 2022-10-07 DIAGNOSIS — Z11.4 ENCOUNTER FOR SCREENING FOR HIV: ICD-10-CM

## 2022-10-07 DIAGNOSIS — Z13.6 ENCOUNTER FOR SCREENING FOR CARDIOVASCULAR DISORDERS: ICD-10-CM

## 2022-10-07 DIAGNOSIS — I10 HYPERTENSION, ESSENTIAL: ICD-10-CM

## 2022-10-07 DIAGNOSIS — Z76.89 ENCOUNTER TO ESTABLISH CARE: Primary | ICD-10-CM

## 2022-10-07 DIAGNOSIS — Z11.59 NEED FOR HEPATITIS C SCREENING TEST: ICD-10-CM

## 2022-10-07 PROCEDURE — 93000 EKG 12-LEAD: ICD-10-PCS | Mod: S$GLB,,, | Performed by: INTERNAL MEDICINE

## 2022-10-07 PROCEDURE — 99999 PR PBB SHADOW E&M-EST. PATIENT-LVL IV: CPT | Mod: PBBFAC,,, | Performed by: STUDENT IN AN ORGANIZED HEALTH CARE EDUCATION/TRAINING PROGRAM

## 2022-10-07 PROCEDURE — 99396 PREV VISIT EST AGE 40-64: CPT | Mod: S$GLB,,, | Performed by: STUDENT IN AN ORGANIZED HEALTH CARE EDUCATION/TRAINING PROGRAM

## 2022-10-07 PROCEDURE — 93000 ELECTROCARDIOGRAM COMPLETE: CPT | Mod: S$GLB,,, | Performed by: INTERNAL MEDICINE

## 2022-10-07 PROCEDURE — 99396 PR PREVENTIVE VISIT,EST,40-64: ICD-10-PCS | Mod: S$GLB,,, | Performed by: STUDENT IN AN ORGANIZED HEALTH CARE EDUCATION/TRAINING PROGRAM

## 2022-10-07 PROCEDURE — 99999 PR PBB SHADOW E&M-EST. PATIENT-LVL IV: ICD-10-PCS | Mod: PBBFAC,,, | Performed by: STUDENT IN AN ORGANIZED HEALTH CARE EDUCATION/TRAINING PROGRAM

## 2022-10-07 RX ORDER — LOSARTAN POTASSIUM 50 MG/1
50 TABLET ORAL DAILY
Qty: 30 TABLET | Refills: 3 | Status: SHIPPED | OUTPATIENT
Start: 2022-10-07 | End: 2023-01-05

## 2022-10-07 NOTE — PROGRESS NOTES
"Subjective:       Patient ID: Ryan Luna is a 42 y.o. male.    Chief Complaint: Annual Exam      HPI:  42 y.o. male presents to [facility] here for annual exam, to establish care, and for refill    HTN:  Home BP log?: Hasn't been monitoring lately  Medications: losartan 50 mg  Compliance?: Has been out of medication for past month. Requesting refills.    Diet?: No specific diet, not avoiding salts  Exercise?: No regular exercise    Last PCP?: Dr. Pope  Allergies: NKDA  Medical History: Essential HTN  Medications: losartan 50 mg  Surgical History: right elbow 2020  Family History: No known cancers, no known autoimmune disease  Social History: Never smoker, EtOH couple drinks on weekend 2-3 drinks, no illicits    Fasting?: Ate a mint  Hep C screening: Amenable  HIV Screening: Amenable  Last tetanus vaccine?: Declines  Flu vaccine?: Declines      Review of Systems   Constitutional:  Negative for chills, diaphoresis, fatigue and fever.   HENT:  Negative for congestion, sinus pressure, sneezing and sore throat.    Respiratory:  Negative for cough and shortness of breath.    Cardiovascular:  Negative for chest pain and palpitations.   Gastrointestinal:  Negative for abdominal pain, diarrhea, nausea and vomiting.   Musculoskeletal:  Negative for arthralgias, joint swelling and myalgias.   Skin:  Negative for rash and wound.   Neurological:  Negative for dizziness, weakness, numbness and headaches.     Objective:      Vitals:    10/07/22 0822   BP: (!) 150/90   BP Location: Left arm   Patient Position: Sitting   BP Method: Medium (Manual)   Pulse: 86   Resp: 18   Temp: 97.9 °F (36.6 °C)   TempSrc: Skin   SpO2: 99%   Weight: 86 kg (189 lb 9.5 oz)   Height: 5' 9" (1.753 m)     Physical Exam  Vitals reviewed.   Constitutional:       General: He is not in acute distress.     Appearance: Normal appearance. He is not ill-appearing.   HENT:      Head: Normocephalic and atraumatic.   Eyes:      General:         Right eye: " No discharge.         Left eye: No discharge.      Conjunctiva/sclera: Conjunctivae normal.   Neck:      Thyroid: No thyroid mass, thyromegaly or thyroid tenderness.   Cardiovascular:      Rate and Rhythm: Normal rate and regular rhythm.      Pulses: Normal pulses.      Heart sounds: Normal heart sounds.   Pulmonary:      Effort: Pulmonary effort is normal.      Breath sounds: Normal breath sounds.   Abdominal:      General: Abdomen is flat. Bowel sounds are normal. There is no distension.      Palpations: Abdomen is soft. There is no mass.      Tenderness: There is no abdominal tenderness. There is no guarding or rebound.   Musculoskeletal:         General: No deformity.      Cervical back: Neck supple. No rigidity.   Lymphadenopathy:      Cervical: No cervical adenopathy.   Skin:     General: Skin is warm and dry.      Coloration: Skin is not jaundiced.   Neurological:      General: No focal deficit present.      Mental Status: He is alert and oriented to person, place, and time.   Psychiatric:         Mood and Affect: Mood normal.         Behavior: Behavior normal.           Lab Results   Component Value Date     04/02/2020     04/02/2020    K 4.3 04/02/2020    K 4.3 04/02/2020     04/02/2020     04/02/2020    CO2 28 04/02/2020    CO2 28 04/02/2020    BUN 8 04/02/2020    BUN 8 04/02/2020    CREATININE 1.2 04/02/2020    CREATININE 1.2 04/02/2020    ANIONGAP 7 (L) 04/02/2020    ANIONGAP 7 (L) 04/02/2020     No results found for: HGBA1C  No results found for: BNP, BNPTRIAGEBLO    No results found for: WBC, HGB, HCT, PLT, GRAN  No results found for: CHOL, HDL, LDLCALC, TRIG       Current Outpatient Medications:     losartan (COZAAR) 50 MG tablet, Take 1 tablet (50 mg total) by mouth once daily., Disp: 30 tablet, Rfl: 3        Assessment:       1. Encounter to establish care    2. Hypertension, essential    3. Encounter for screening for cardiovascular disorders    4. Need for hepatitis C  screening test    5. Encounter for screening for HIV           Plan:       Encounter to establish care    Hypertension, essential  Comments:  Lifestyle changes discussed.  Orders:  -     losartan (COZAAR) 50 MG tablet; Take 1 tablet (50 mg total) by mouth once daily.  Dispense: 30 tablet; Refill: 3  -     EKG 12-lead  -     CBC Auto Differential; Future; Expected date: 10/07/2022  -     Comprehensive Metabolic Panel; Future; Expected date: 10/07/2022  -     TSH; Future; Expected date: 10/07/2022  -     URINALYSIS; Future; Expected date: 10/07/2022  -     Lipid Panel; Future; Expected date: 10/07/2022  - RTC in 2 weeks for nurse visit    Encounter for screening for cardiovascular disorders  -     Lipid Panel; Future; Expected date: 10/07/2022    Need for hepatitis C screening test  -     Hepatitis C Antibody; Future; Expected date: 10/07/2022    Encounter for screening for HIV  -     HIV 1/2 Ag/Ab (4th Gen); Future; Expected date: 10/07/2022     RTC in 1 year

## 2022-10-10 DIAGNOSIS — R73.03 PREDIABETES: Primary | ICD-10-CM

## 2022-10-10 RX ORDER — INSULIN PUMP SYRINGE, 3 ML
EACH MISCELLANEOUS
Qty: 1 EACH | Refills: 0 | Status: SHIPPED | OUTPATIENT
Start: 2022-10-10 | End: 2023-10-10

## 2022-10-10 RX ORDER — LANCETS
EACH MISCELLANEOUS
Qty: 90 EACH | Refills: 3 | Status: SHIPPED | OUTPATIENT
Start: 2022-10-10

## 2022-10-10 RX ORDER — METFORMIN HYDROCHLORIDE 500 MG/1
TABLET ORAL
Qty: 42 TABLET | Refills: 0 | Status: SHIPPED | OUTPATIENT
Start: 2022-10-10 | End: 2022-11-13

## 2023-05-05 ENCOUNTER — OFFICE VISIT (OUTPATIENT)
Dept: PRIMARY CARE CLINIC | Facility: CLINIC | Age: 43
End: 2023-05-05
Payer: COMMERCIAL

## 2023-05-05 VITALS
RESPIRATION RATE: 18 BRPM | BODY MASS INDEX: 25.29 KG/M2 | HEIGHT: 69 IN | TEMPERATURE: 98 F | SYSTOLIC BLOOD PRESSURE: 130 MMHG | WEIGHT: 170.75 LBS | OXYGEN SATURATION: 99 % | HEART RATE: 87 BPM | DIASTOLIC BLOOD PRESSURE: 82 MMHG

## 2023-05-05 DIAGNOSIS — R73.03 PREDIABETES: Primary | ICD-10-CM

## 2023-05-05 PROCEDURE — 99999 PR PBB SHADOW E&M-EST. PATIENT-LVL IV: ICD-10-PCS | Mod: PBBFAC,,, | Performed by: STUDENT IN AN ORGANIZED HEALTH CARE EDUCATION/TRAINING PROGRAM

## 2023-05-05 PROCEDURE — 99214 PR OFFICE/OUTPT VISIT, EST, LEVL IV, 30-39 MIN: ICD-10-PCS | Mod: S$GLB,,, | Performed by: STUDENT IN AN ORGANIZED HEALTH CARE EDUCATION/TRAINING PROGRAM

## 2023-05-05 PROCEDURE — 99999 PR PBB SHADOW E&M-EST. PATIENT-LVL IV: CPT | Mod: PBBFAC,,, | Performed by: STUDENT IN AN ORGANIZED HEALTH CARE EDUCATION/TRAINING PROGRAM

## 2023-05-05 PROCEDURE — 99214 OFFICE O/P EST MOD 30 MIN: CPT | Mod: S$GLB,,, | Performed by: STUDENT IN AN ORGANIZED HEALTH CARE EDUCATION/TRAINING PROGRAM

## 2023-05-05 NOTE — PROGRESS NOTES
"Subjective:       Patient ID: Ryan Luna is a 42 y.o. male.    Chief Complaint: Diabetes      HPI:  42 y.o. male presents to [facility] with blood sugar concerns    Most recent HbA1c: 6.2% 10/7/2022  Home Reads?: Hasn't been checking  Current Meds: metformin 500 mg bid  On Insulin?: No  Compliance: Only times missed is when he ran out. 2-3 times total.    Diet: Cut out excess sugars. No drinks with sugar.  Exercises: At work rather than use elevator uses steps. [place] on 10th floor.    Review of Systems   Constitutional:  Negative for chills, diaphoresis, fatigue and fever.   HENT:  Negative for congestion, sinus pressure, sneezing and sore throat.    Respiratory:  Negative for cough and shortness of breath.    Cardiovascular:  Negative for chest pain and palpitations.   Gastrointestinal:  Negative for abdominal pain, diarrhea, nausea and vomiting.   Musculoskeletal:  Negative for arthralgias, joint swelling and myalgias.   Skin:  Negative for rash and wound.   Neurological:  Negative for weakness and numbness.     Objective:      Vitals:    05/05/23 0810   BP: 130/82   BP Location: Left arm   Patient Position: Sitting   BP Method: Medium (Manual)   Pulse: 87   Resp: 18   Temp: 97.8 °F (36.6 °C)   TempSrc: Temporal   SpO2: 99%   Weight: 77.4 kg (170 lb 11.9 oz)   Height: 5' 9" (1.753 m)     Physical Exam  Vitals reviewed.   Constitutional:       General: He is not in acute distress.     Appearance: Normal appearance. He is not ill-appearing.   HENT:      Head: Normocephalic and atraumatic.   Eyes:      General:         Right eye: No discharge.         Left eye: No discharge.      Conjunctiva/sclera: Conjunctivae normal.   Cardiovascular:      Rate and Rhythm: Normal rate and regular rhythm.      Pulses: Normal pulses.      Heart sounds: No murmur heard.  Pulmonary:      Effort: Pulmonary effort is normal.      Breath sounds: Normal breath sounds.   Musculoskeletal:         General: No deformity.      Cervical " back: Neck supple. No rigidity.   Lymphadenopathy:      Cervical: No cervical adenopathy.   Skin:     General: Skin is warm and dry.      Coloration: Skin is not jaundiced.   Neurological:      General: No focal deficit present.      Mental Status: He is alert and oriented to person, place, and time.   Psychiatric:         Mood and Affect: Mood normal.         Behavior: Behavior normal.           Lab Results   Component Value Date     10/07/2022    K 4.5 10/07/2022     10/07/2022    CO2 24 10/07/2022    BUN 8 10/07/2022    CREATININE 1.3 10/07/2022    ANIONGAP 9 10/07/2022     Lab Results   Component Value Date    HGBA1C 6.2 (H) 10/07/2022     No results found for: BNP, BNPTRIAGEBLO    Lab Results   Component Value Date    WBC 3.54 (L) 10/07/2022    HGB 13.9 (L) 10/07/2022    HCT 42.9 10/07/2022     10/07/2022    GRAN 1.9 10/07/2022    GRAN 52.3 10/07/2022     Lab Results   Component Value Date    CHOL 252 (H) 10/07/2022    HDL 38 (L) 10/07/2022    LDLCALC 198.2 (H) 10/07/2022    TRIG 79 10/07/2022          Current Outpatient Medications:     blood sugar diagnostic Strp, To check BG once daily while fasting (before meals), to use with insurance preferred meter, Disp: 90 each, Rfl: 3    blood-glucose meter kit, To check BG once daily while fasting (before meals), to use with insurance preferred meter, Disp: 1 each, Rfl: 0    lancets Misc, To check BG once daily while fasting (before meals), to use with insurance preferred meter, Disp: 90 each, Rfl: 3    losartan (COZAAR) 50 MG tablet, TAKE 1 TABLET BY MOUTH EVERY DAY, Disp: 90 tablet, Rfl: 3    metFORMIN (GLUCOPHAGE) 500 MG tablet, Take 1 tablet (500 mg total) by mouth 2 (two) times daily with meals., Disp: 180 tablet, Rfl: 0        Assessment:       1. Prediabetes           Plan:       Prediabetes  -     Hemoglobin A1C; Future; Expected date: 05/05/2023  - Patient is doing very well from a conservative standpoint. Continue with good diet and  exercise interventions    RTC in 1 year

## 2023-05-19 DIAGNOSIS — R73.03 PREDIABETES: ICD-10-CM

## 2023-05-19 RX ORDER — METFORMIN HYDROCHLORIDE 500 MG/1
TABLET ORAL
Qty: 180 TABLET | Refills: 0 | Status: SHIPPED | OUTPATIENT
Start: 2023-05-19

## 2023-05-19 NOTE — TELEPHONE ENCOUNTER
No care due was identified.  Health system Embedded Care Due Messages. Reference number: [id number].   5/19/2023 3:43:58 AM CDT

## 2023-05-19 NOTE — TELEPHONE ENCOUNTER
Refill Decision Note   Ryan Jeremy  is requesting a refill authorization.  Brief Assessment and Rationale for Refill:  Approve     Medication Therapy Plan:       Medication Reconciliation Completed: No   Comments:     No Care Gaps recommended.     Note composed:8:21 AM 05/19/2023

## 2023-09-15 ENCOUNTER — PATIENT MESSAGE (OUTPATIENT)
Dept: ADMINISTRATIVE | Facility: HOSPITAL | Age: 43
End: 2023-09-15
Payer: COMMERCIAL

## 2023-09-18 ENCOUNTER — PATIENT MESSAGE (OUTPATIENT)
Dept: PRIMARY CARE CLINIC | Facility: CLINIC | Age: 43
End: 2023-09-18
Payer: COMMERCIAL

## 2023-09-26 ENCOUNTER — PATIENT OUTREACH (OUTPATIENT)
Dept: ADMINISTRATIVE | Facility: HOSPITAL | Age: 43
End: 2023-09-26
Payer: COMMERCIAL

## 2023-09-26 NOTE — PROGRESS NOTES
"Health Maintenance Due   Topic Date Due    COVID-19 Vaccine (1) Never done    TETANUS VACCINE  09/25/2017    Colorectal Cancer Screening  Never done    Influenza Vaccine (1) 09/01/2023     Immunizations - reviewed and updated   Care Everywhere - Care Teams - updated   Outreach - Spoke with patient in regards to overdue colon cancer screening. Patient states "he would like to do some research on the home screenings versus the colonoscopy and will give me a call back." Verbalized understanding.       "

## 2023-12-11 ENCOUNTER — OFFICE VISIT (OUTPATIENT)
Dept: UROLOGY | Facility: CLINIC | Age: 43
End: 2023-12-11
Attending: UROLOGY
Payer: COMMERCIAL

## 2023-12-11 VITALS
HEART RATE: 84 BPM | HEIGHT: 69 IN | WEIGHT: 188.94 LBS | SYSTOLIC BLOOD PRESSURE: 159 MMHG | BODY MASS INDEX: 27.98 KG/M2 | DIASTOLIC BLOOD PRESSURE: 108 MMHG

## 2023-12-11 DIAGNOSIS — Z12.5 SCREENING FOR PROSTATE CANCER: ICD-10-CM

## 2023-12-11 DIAGNOSIS — Z30.09 VASECTOMY EVALUATION: Primary | ICD-10-CM

## 2023-12-11 PROCEDURE — 99999 PR PBB SHADOW E&M-EST. PATIENT-LVL III: ICD-10-PCS | Mod: PBBFAC,,, | Performed by: STUDENT IN AN ORGANIZED HEALTH CARE EDUCATION/TRAINING PROGRAM

## 2023-12-11 PROCEDURE — 99202 PR OFFICE/OUTPT VISIT, NEW, LEVL II, 15-29 MIN: ICD-10-PCS | Mod: S$GLB,,, | Performed by: STUDENT IN AN ORGANIZED HEALTH CARE EDUCATION/TRAINING PROGRAM

## 2023-12-11 PROCEDURE — 99999 PR PBB SHADOW E&M-EST. PATIENT-LVL III: CPT | Mod: PBBFAC,,, | Performed by: STUDENT IN AN ORGANIZED HEALTH CARE EDUCATION/TRAINING PROGRAM

## 2023-12-11 PROCEDURE — 99202 OFFICE O/P NEW SF 15 MIN: CPT | Mod: S$GLB,,, | Performed by: STUDENT IN AN ORGANIZED HEALTH CARE EDUCATION/TRAINING PROGRAM

## 2023-12-11 RX ORDER — DIAZEPAM 5 MG/1
5 TABLET ORAL
Qty: 1 TABLET | Refills: 0 | Status: SHIPPED | OUTPATIENT
Start: 2023-12-11

## 2023-12-11 NOTE — PROGRESS NOTES
Chief Complaint:   Undesired fertility    HPI:  Mr. Luna is a 43 y.o.  male who presents for evaluation re vasectomy. He is . He has 1 child with his wife, who has 2 children from another marriage. He is certain that he desires no more. He is aware of other forms of contraception.  He's currently using nothing for contraception. He is aware that vasectomy is to be considered permanent/irreversible.    He denies orchalgia.  No dysuria.  No hematuria.    No prior scrotal surgeries.    ROS:  Ryan Luna denies headache, blurred vision, fever, nausea, vomiting, chills, abdominal pain, chest pain, sore throat, bleeding per rectum, cough, SOB, recent loss of consciousness, recent mental status changes, seizures, dizziness, or upper or lower extremity weakness.    Past Medical History    Past Medical History:   Diagnosis Date    Hypertension        Past Surgical History    Past Surgical History:   Procedure Laterality Date    ELBOW SURGERY         Social History    Social History     Socioeconomic History    Marital status:    Tobacco Use    Smoking status: Never    Smokeless tobacco: Never   Substance and Sexual Activity    Alcohol use: Not Currently    Drug use: Never    Sexual activity: Yes     Partners: Female     Social Determinants of Health     Financial Resource Strain: Low Risk  (4/2/2020)    Overall Financial Resource Strain (CARDIA)     Difficulty of Paying Living Expenses: Not hard at all   Food Insecurity: No Food Insecurity (4/2/2020)    Hunger Vital Sign     Worried About Running Out of Food in the Last Year: Never true     Ran Out of Food in the Last Year: Never true   Transportation Needs: No Transportation Needs (4/2/2020)    PRAPARE - Transportation     Lack of Transportation (Medical): No     Lack of Transportation (Non-Medical): No   Physical Activity: Inactive (4/2/2020)    Exercise Vital Sign     Days of Exercise per Week: 0 days     Minutes of Exercise per Session: 0 min    Stress: No Stress Concern Present (4/2/2020)    [employer] - Occupational Stress Questionnaire     Feeling of Stress : Not at all   Social Connections: Unknown (4/2/2020)    Social Connection and Isolation Panel [NHANES]     Frequency of Communication with Friends and Family: More than three times a week     Frequency of Social Gatherings with Friends and Family: Twice a week     Active Member of Clubs or Organizations: No     Attends Club or Organization Meetings: Never     Marital Status:        Family History  Family History   Problem Relation Age of Onset    Diabetes Mother          Medications    Current Outpatient Medications:     blood sugar diagnostic Strp, To check BG once daily while fasting (before meals), to use with insurance preferred meter, Disp: 90 each, Rfl: 3    lancets Misc, To check BG once daily while fasting (before meals), to use with insurance preferred meter, Disp: 90 each, Rfl: 3    losartan (COZAAR) 50 MG tablet, TAKE 1 TABLET BY MOUTH EVERY DAY, Disp: 90 tablet, Rfl: 3    blood-glucose meter kit, To check BG once daily while fasting (before meals), to use with insurance preferred meter (Patient not taking: Reported on 12/11/2023), Disp: 1 each, Rfl: 0    metFORMIN (GLUCOPHAGE) 500 MG tablet, TAKE 1 TABLET(500 MG) BY MOUTH TWICE DAILY WITH MEALS (Patient not taking: Reported on 12/11/2023), Disp: 180 tablet, Rfl: 0    Allergies  Review of patient's allergies indicates:  No Known Allergies      ?  PHYSICAL EXAM:    The patient generally appears in good health, is appropriately interactive, and is in no apparent distress.     Eyes: anicteric sclerae, moist conjunctivae; no lid-lag; PERRLA     HENT: Atraumatic; oropharynx clear with moist mucous membranes and no mucosal ulcerations;normal hard and soft palate.  No evidence of lymphadenopathy.    Neck: Trachea midline.  No thyromegaly.    Skin: No lesions.    Mental: Cooperative with normal affect.  Is  oriented to time, place, and person.    Neuro: Grossly intact.    Chest: Normal inspiratory effort.   No accessory muscles.  No audible wheezes.  Respirations symmetric on inspiration and expiration.    Heart: Regular rhythm.      Abdomen:  Soft, non-tender. No masses or organomegaly. Bladder is not palpable. No evidence of flank discomfort. No evidence of inguinal hernia.    Genitourinary: The penis has no evidence of plaques or induration. The urethral meatus is normal. The testes, epididymides, and cord structures are normal in size and contour bilaterally. Vasa are palpable bilaterally. The scrotum is normal in size and contour.    Extremities: No clubbing, cyanosis, or edema          A/P:  Ryan Luna is a 43 y.o. male who presents for evaluation re vasectomy.    1.I discussed with the patient risks and benefits, as well as alternatives. We discussed possible complications at length, including fever, infection, bleeding--possibly requiring reoperation,testicular injury or atrophy with loss of function, chronic pain, persistent or recurrent presence of sperm in the ejaculate, vasal recanalization, as well as the risks attendant to the anesthetic.  2.We discussed that he should stop aspirin, ibuprofen, and similar products at least one week prior to the procedure. Acetominophen is okay to use for headaches, pain, etc.  3. He should bring an athletic supporter and loose fitting sweat pants on the day of the procedure.  4. We discussed that he must continue to use contraception until semen analysis (checked at approximately 4-6 weeks post-vasectomy) reveals azoospermia.  5. He will schedule an elective vasectomy.  6. Will obtain screening PSA.

## 2023-12-30 DIAGNOSIS — I10 HYPERTENSION, ESSENTIAL: ICD-10-CM

## 2023-12-30 NOTE — TELEPHONE ENCOUNTER
Care Due:                  Date            Visit Type   Department     Provider  --------------------------------------------------------------------------------                                EP -                              PRIMARY      [facility]  Last Visit: 05-      CARE ([facility])   PRIMARY CARE   Mauricio Calvert  Next Visit: None Scheduled  None         None Found                                                            Last  Test          Frequency    Reason                     Performed    Due Date  --------------------------------------------------------------------------------    CMP.........  12 months..  losartan, metFORMIN......  10-   10-    HBA1C.......  6 months...  metFORMIN................  05- 11-    Health [facility] Embedded Care Due Messages. Reference number: [id number].   12/30/2023 7:09:36 AM CST

## 2024-01-01 DIAGNOSIS — I10 HYPERTENSION, ESSENTIAL: ICD-10-CM

## 2024-01-01 NOTE — TELEPHONE ENCOUNTER
Refill Routing Note   Medication(s) are not appropriate for processing by [facility] for the following reason(s):      Required labs outdated  Required vitals abnormal    [facility] action(s):  Defer Care Due:  Labs due            Appointments  past 12m or future 3m with PCP    Date Provider   Last Visit   5/5/2023 Mauricio Calvert MD   Next Visit   Visit date not found Mauricio Calvert MD   ED visits in past 90 days: 0        Note composed:6:02 PM 12/31/2023

## 2024-01-01 NOTE — TELEPHONE ENCOUNTER
No care due was identified.  Health [facility] Embedded Care Due Messages. Reference number: [id number].   1/01/2024 12:41:46 PM CST

## 2024-01-02 DIAGNOSIS — Z51.81 MEDICATION MONITORING ENCOUNTER: Primary | ICD-10-CM

## 2024-01-02 DIAGNOSIS — Z13.6 ENCOUNTER FOR SCREENING FOR CARDIOVASCULAR DISORDERS: ICD-10-CM

## 2024-01-02 DIAGNOSIS — Z00.00 VISIT FOR ANNUAL HEALTH EXAMINATION: ICD-10-CM

## 2024-01-02 RX ORDER — LOSARTAN POTASSIUM 50 MG/1
50 TABLET ORAL DAILY
Qty: 90 TABLET | Refills: 1 | Status: SHIPPED | OUTPATIENT
Start: 2024-01-02

## 2024-01-02 RX ORDER — LOSARTAN POTASSIUM 50 MG/1
TABLET ORAL
Qty: 90 TABLET | Refills: 0 | Status: SHIPPED | OUTPATIENT
Start: 2024-01-02

## 2024-01-03 ENCOUNTER — PROCEDURE VISIT (OUTPATIENT)
Dept: UROLOGY | Facility: CLINIC | Age: 44
End: 2024-01-03
Payer: COMMERCIAL

## 2024-01-03 VITALS
HEIGHT: 69 IN | SYSTOLIC BLOOD PRESSURE: 126 MMHG | WEIGHT: 186.81 LBS | DIASTOLIC BLOOD PRESSURE: 85 MMHG | BODY MASS INDEX: 27.67 KG/M2 | HEART RATE: 72 BPM

## 2024-01-03 DIAGNOSIS — Z30.09 VASECTOMY EVALUATION: ICD-10-CM

## 2024-01-03 DIAGNOSIS — Z30.2 ENCOUNTER FOR VASECTOMY: Primary | ICD-10-CM

## 2024-01-03 PROCEDURE — 55250 REMOVAL OF SPERM DUCT(S): CPT | Mod: S$GLB,,, | Performed by: STUDENT IN AN ORGANIZED HEALTH CARE EDUCATION/TRAINING PROGRAM

## 2024-01-03 RX ORDER — LIDOCAINE HYDROCHLORIDE 10 MG/ML
1 INJECTION INFILTRATION; PERINEURAL
Status: COMPLETED | OUTPATIENT
Start: 2024-01-03 | End: 2024-01-03

## 2024-01-03 RX ORDER — OXYCODONE AND ACETAMINOPHEN 5; 325 MG/1; MG/1
1 TABLET ORAL EVERY 4 HOURS PRN
Qty: 5 TABLET | Refills: 0 | Status: SHIPPED | OUTPATIENT
Start: 2024-01-03

## 2024-01-03 RX ADMIN — LIDOCAINE HYDROCHLORIDE 1 ML: 10 INJECTION INFILTRATION; PERINEURAL at 01:01

## 2024-01-03 NOTE — PROCEDURES
"Ryan Luna is a 43 y.o. male patient.    Pulse: 72 (01/03/24 1109)  BP: 126/85 (01/03/24 1109)  Weight: 84.8 kg (186 lb 13.4 oz) (01/03/24 1109)  Height: 5' 9" (175.3 cm) (01/03/24 1109)       Vasectomy    Date/Time: 1/3/2024 12:18 PM    Performed by: Compa Sanford MD  Authorized by: Compa Sanford MD        1/3/2024  Date: 01/03/2024     Pre procedure diagnosis: male sterilization    Post procedure diagnosis:same      Procedure performed: Bilateral vasectomy    Blood loss: Min.    Specimen: None    Procedure in detail:  After ainformed consent was obtained, the patient was placed in the supine position.  The skin was sterilized with a prep.  A time out was performed.  Attention was then turned to the patient's right hemiscrotum.    The vas was isolated on this side.  Local anesthesia was applied with 1% lidocaine.  The skin overlying the vas was incised sharply.  The vas was then isolated and grasped with a ring forceps.  It was brought through the incision.  The adventia overlying the vas was incised sharply.  The vas was then doubly clamped with a hemostat.  The vas was divided between the two hemostats with a 15 blade scalpel.The ends of the vas were cauterized and tied with 2-0 silk sutures.  Two sutures were placed on each side. Electrocautery was used to obtain hemostasis.  A fascial interposition was then done with a 3-0 chromic. The wound was then closed with a 3-0 chromic.    This process was then repeated on the left side in identical fashion.    He tolerated the procedure well without complication.  He was advised to continue contraception until he brings in a semen sample that shows no sperm.  He is also to avoid lifting, strenuous exercise, intercourse, NSAIDS, and ASA for 1 week.  He will be discharged home is stable condition.  He is to follow up prn.   "

## 2024-03-15 ENCOUNTER — PATIENT MESSAGE (OUTPATIENT)
Dept: UROLOGY | Facility: CLINIC | Age: 44
End: 2024-03-15

## 2024-03-15 ENCOUNTER — CLINICAL SUPPORT (OUTPATIENT)
Dept: UROLOGY | Facility: CLINIC | Age: 44
End: 2024-03-15
Payer: COMMERCIAL

## 2024-03-15 DIAGNOSIS — Z30.09 VASECTOMY EVALUATION: Primary | ICD-10-CM

## 2024-03-15 NOTE — PROGRESS NOTES
Ryan Luna is a 43 y.o. male status post vasectomy on 1/3/24 here for post-vasectomy SA.     Semen Microscopy   Uncentrifuged fresh semen sample examined under LP and HP microscopy.  No sperm noted in sample.    Patient notified in MyChart.

## 2024-06-26 ENCOUNTER — PATIENT OUTREACH (OUTPATIENT)
Dept: ADMINISTRATIVE | Facility: HOSPITAL | Age: 44
End: 2024-06-26
Payer: COMMERCIAL

## 2024-06-26 NOTE — PROGRESS NOTES
Health Maintenance Due   Topic Date Due    TETANUS VACCINE  09/25/2017    Colorectal Cancer Screening  Never done    COVID-19 Vaccine (3 - 2023-24 season) 09/01/2023    Hemoglobin A1c (Prediabetes)  05/05/2024        Chart review done.    updated.   Immunizations reviewed & updated.   Care Everywhere updated.

## 2024-07-10 ENCOUNTER — OFFICE VISIT (OUTPATIENT)
Dept: PRIMARY CARE CLINIC | Facility: CLINIC | Age: 44
End: 2024-07-10
Payer: COMMERCIAL

## 2024-07-10 VITALS
SYSTOLIC BLOOD PRESSURE: 144 MMHG | WEIGHT: 197.06 LBS | RESPIRATION RATE: 18 BRPM | OXYGEN SATURATION: 98 % | HEART RATE: 75 BPM | HEIGHT: 69 IN | DIASTOLIC BLOOD PRESSURE: 94 MMHG | BODY MASS INDEX: 29.19 KG/M2

## 2024-07-10 DIAGNOSIS — Z23 NEED FOR VACCINATION: ICD-10-CM

## 2024-07-10 DIAGNOSIS — Z12.11 COLON CANCER SCREENING: ICD-10-CM

## 2024-07-10 DIAGNOSIS — Z51.81 MEDICATION MONITORING ENCOUNTER: ICD-10-CM

## 2024-07-10 DIAGNOSIS — Z00.00 ANNUAL PHYSICAL EXAM: Primary | ICD-10-CM

## 2024-07-10 DIAGNOSIS — Z13.1 DIABETES MELLITUS SCREENING: ICD-10-CM

## 2024-07-10 DIAGNOSIS — E78.5 HYPERLIPIDEMIA, UNSPECIFIED HYPERLIPIDEMIA TYPE: ICD-10-CM

## 2024-07-10 PROCEDURE — 99999 PR PBB SHADOW E&M-EST. PATIENT-LVL III: CPT | Mod: PBBFAC,,, | Performed by: STUDENT IN AN ORGANIZED HEALTH CARE EDUCATION/TRAINING PROGRAM

## 2024-07-10 PROCEDURE — 90471 IMMUNIZATION ADMIN: CPT | Mod: S$GLB,,, | Performed by: STUDENT IN AN ORGANIZED HEALTH CARE EDUCATION/TRAINING PROGRAM

## 2024-07-10 PROCEDURE — 90715 TDAP VACCINE 7 YRS/> IM: CPT | Mod: S$GLB,,, | Performed by: STUDENT IN AN ORGANIZED HEALTH CARE EDUCATION/TRAINING PROGRAM

## 2024-07-10 PROCEDURE — 99396 PREV VISIT EST AGE 40-64: CPT | Mod: 25,S$GLB,, | Performed by: STUDENT IN AN ORGANIZED HEALTH CARE EDUCATION/TRAINING PROGRAM

## 2024-07-10 NOTE — PROGRESS NOTES
"Subjective:       Patient ID: Ryan Luna is a 44 y.o. male.    Chief Complaint: Annual Exam    HPI:  44 y.o. male presents to [facility] for annual wellness visit    Acute concerns?: None today, here for routine exam. Was dieting well and lost weight, but feels picking back up.    Diet?: No specific, trying to avoid sodium in food  Exercise?: Tries to walk frequently    Fasting?: No  Colon cancer screening?: Amenable  Last tetanus vaccine?: Amenable    Review of Systems   Constitutional:  Negative for activity change and unexpected weight change.   HENT:  Negative for hearing loss, rhinorrhea and trouble swallowing.    Eyes:  Negative for discharge and visual disturbance.   Respiratory:  Negative for chest tightness and wheezing.    Cardiovascular:  Negative for chest pain and palpitations.   Gastrointestinal:  Negative for blood in stool, constipation, diarrhea and vomiting.   Endocrine: Negative for polydipsia and polyuria.   Genitourinary:  Negative for difficulty urinating, hematuria and urgency.   Musculoskeletal:  Negative for arthralgias, joint swelling and neck pain.   Neurological:  Negative for weakness and headaches.   Psychiatric/Behavioral:  Negative for confusion and dysphoric mood.        Objective:      Vitals:    07/10/24 1453   BP: (!) 140/94   BP Location: Right arm   Patient Position: Sitting   BP Method: Medium (Manual)   Pulse: 75   Resp: 18   SpO2: 98%   Weight: 89.4 kg (197 lb 1.5 oz)   Height: 5' 9" (1.753 m)     Physical Exam  Vitals reviewed.   Constitutional:       General: He is not in acute distress.     Appearance: Normal appearance. He is not ill-appearing.   HENT:      Head: Normocephalic and atraumatic.   Eyes:      General:         Right eye: No discharge.         Left eye: No discharge.      Conjunctiva/sclera: Conjunctivae normal.   Cardiovascular:      Rate and Rhythm: Normal rate and regular rhythm.      Pulses: Normal pulses.      Heart sounds: No murmur " "heard.  Pulmonary:      Effort: Pulmonary effort is normal.      Breath sounds: Normal breath sounds.   Musculoskeletal:         General: No deformity.      Cervical back: Neck supple. No rigidity.   Lymphadenopathy:      Cervical: No cervical adenopathy.   Skin:     General: Skin is warm and dry.      Coloration: Skin is not jaundiced.   Neurological:      General: No focal deficit present.      Mental Status: He is alert and oriented to person, place, and time.   Psychiatric:         Mood and Affect: Mood normal.         Behavior: Behavior normal.             Lab Results   Component Value Date     10/07/2022    K 4.5 10/07/2022     10/07/2022    CO2 24 10/07/2022    BUN 8 10/07/2022    CREATININE 1.3 10/07/2022    ANIONGAP 9 10/07/2022     Lab Results   Component Value Date    HGBA1C 5.4 05/05/2023     No results found for: "BNP", "BNPTRIAGEBLO"    Lab Results   Component Value Date    WBC 3.54 (L) 10/07/2022    HGB 13.9 (L) 10/07/2022    HCT 42.9 10/07/2022     10/07/2022    GRAN 1.9 10/07/2022    GRAN 52.3 10/07/2022     Lab Results   Component Value Date    CHOL 252 (H) 10/07/2022    HDL 38 (L) 10/07/2022    LDLCALC 198.2 (H) 10/07/2022    TRIG 79 10/07/2022          Current Outpatient Medications:     losartan (COZAAR) 50 MG tablet, Take 1 tablet (50 mg total) by mouth once daily., Disp: 90 tablet, Rfl: 1        Assessment:       1. Annual physical exam    2. Medication monitoring encounter    3. Hyperlipidemia, unspecified hyperlipidemia type    4. Diabetes mellitus screening    5. Colon cancer screening    6. Need for vaccination           Plan:       Annual physical exam  Medication monitoring encounter  -     CBC Auto Differential; Future; Expected date: 07/10/2024  -     Comprehensive Metabolic Panel; Future; Expected date: 07/10/2024  - Health maintenance items reviewed today's visit    Hyperlipidemia, unspecified hyperlipidemia type  -     Lipid Panel; Future; Expected date: " 07/10/2024    Diabetes mellitus screening  -     Hemoglobin A1C; Future; Expected date: 07/10/2024    Colon cancer screening  -     Case Request Endoscopy: COLONOSCOPY    Need for vaccination  -     (In Office Administered) Tdap Vaccine    RTC PRN

## 2024-07-12 ENCOUNTER — TELEPHONE (OUTPATIENT)
Dept: PULMONOLOGY | Facility: CLINIC | Age: 44
End: 2024-07-12
Payer: COMMERCIAL

## 2024-07-12 ENCOUNTER — PATIENT MESSAGE (OUTPATIENT)
Dept: PULMONOLOGY | Facility: CLINIC | Age: 44
End: 2024-07-12
Payer: COMMERCIAL

## 2024-07-12 RX ORDER — SODIUM, POTASSIUM,MAG SULFATES 17.5-3.13G
1 SOLUTION, RECONSTITUTED, ORAL ORAL DAILY
Qty: 1 KIT | Refills: 0 | Status: SHIPPED | OUTPATIENT
Start: 2024-07-12 | End: 2024-07-14

## 2024-07-24 ENCOUNTER — CLINICAL SUPPORT (OUTPATIENT)
Dept: PRIMARY CARE CLINIC | Facility: CLINIC | Age: 44
End: 2024-07-24
Payer: COMMERCIAL

## 2024-07-24 VITALS — SYSTOLIC BLOOD PRESSURE: 136 MMHG | DIASTOLIC BLOOD PRESSURE: 82 MMHG

## 2024-07-24 DIAGNOSIS — I10 HYPERTENSION, UNSPECIFIED TYPE: Primary | ICD-10-CM

## 2024-07-24 PROCEDURE — 99999 PR PBB SHADOW E&M-EST. PATIENT-LVL I: CPT | Mod: PBBFAC,,,

## 2024-07-24 NOTE — PROGRESS NOTES
Verified pt ID using name and . Obtained bp: 136/82, p, and sp02. Notified physician of results. Instructed pt to continue taking bp medication. Pt verbalized understanding

## 2024-08-13 ENCOUNTER — TELEPHONE (OUTPATIENT)
Dept: GASTROENTEROLOGY | Facility: CLINIC | Age: 44
End: 2024-08-13
Payer: COMMERCIAL

## 2024-08-13 NOTE — TELEPHONE ENCOUNTER
----- Message from Satish Mcclure MD sent at 8/8/2024  8:23 AM CDT -----  Pathology from recent colonoscopy reviewed.  Colon polyp(s) benign.  Repeat colonoscopy in 3 years.

## 2024-09-19 ENCOUNTER — PATIENT MESSAGE (OUTPATIENT)
Dept: PRIMARY CARE CLINIC | Facility: CLINIC | Age: 44
End: 2024-09-19
Payer: COMMERCIAL

## 2024-09-20 DIAGNOSIS — I10 HYPERTENSION, ESSENTIAL: ICD-10-CM

## 2024-09-20 NOTE — TELEPHONE ENCOUNTER
Refill Routing Note   Medication(s) are not appropriate for processing by [facility] for the following reason(s):        Required vitals abnormal  Required labs outdated    [facility] action(s):  Defer     Requires labs : Yes             Appointments  past 12m or future 3m with PCP    Date Provider   Last Visit   7/10/2024 Mauricio Calvert MD   Next Visit   Visit date not found Mauricio Calvert MD   ED visits in past 90 days: 0        Note composed:8:42 AM 09/20/2024

## 2024-09-20 NOTE — TELEPHONE ENCOUNTER
Care Due:                  Date            Visit Type   Department     Provider  --------------------------------------------------------------------------------                                MYCHART                              ANNUAL                              CHECKUP/PHY  [facility]  Last Visit: 07-      S            PRIMARY CARE   Mauricio Calvert  Next Visit: None Scheduled  None         None Found                                                            Last  Test          Frequency    Reason                     Performed    Due Date  --------------------------------------------------------------------------------    CMP.........  12 months..  losartan.................  10-   10-    [facility] Embedded Care Due Messages. Reference number: [id number].   9/20/2024 12:33:03 AM CDT

## 2024-09-22 RX ORDER — LOSARTAN POTASSIUM 50 MG/1
50 TABLET ORAL
Qty: 90 TABLET | Refills: 1 | Status: SHIPPED | OUTPATIENT
Start: 2024-09-22

## 2025-03-25 DIAGNOSIS — I10 HYPERTENSION, ESSENTIAL: ICD-10-CM

## 2025-03-25 RX ORDER — LOSARTAN POTASSIUM 50 MG/1
50 TABLET ORAL
Qty: 90 TABLET | Refills: 1 | Status: SHIPPED | OUTPATIENT
Start: 2025-03-25

## 2025-03-25 NOTE — TELEPHONE ENCOUNTER
Care Due:                  Date            Visit Type   Department     Provider  --------------------------------------------------------------------------------                                MYCHART                              ANNUAL                              CHECKUP/PHY  [facility]  Last Visit: 07-      S            PRIMARY CARE   Mauricio Calvert  Next Visit: None Scheduled  None         None Found                                                            Last  Test          Frequency    Reason                     Performed    Due Date  --------------------------------------------------------------------------------    CMP.........  12 months..  losartan.................  Not Found    Overdue    [employer] Embedded Care Due Messages. Reference number: [id number].   3/25/2025 12:20:41 AM CDT

## 2025-03-25 NOTE — TELEPHONE ENCOUNTER
Refill Routing Note   Medication(s) are not appropriate for processing by [facility] for the following reason(s):        Required vitals abnormal    [facility] action(s):  Defer   Requires labs : Yes - CMP            Appointments  past 12m or future 3m with PCP    Date Provider   Last Visit   Visit date not found Dale Syed MD   Next Visit   Visit date not found Dale Syed MD   ED visits in past 90 days: 0        Note composed:10:58 AM 03/25/2025

## (undated) DEVICE — CORD BIPOLAR SINGLE USE

## (undated) DEVICE — UNDERGLOVES BIOGEL PI SIZE 7.5

## (undated) DEVICE — CLOSURE SKIN STERI STRIP 1/2X4

## (undated) DEVICE — SEE MEDLINE ITEM 152515

## (undated) DEVICE — SEE MEDLINE ITEM 157173

## (undated) DEVICE — NDL SURGEON MAYO #4

## (undated) DEVICE — PACK BASIC

## (undated) DEVICE — DRAPE C ARM 42 X 120 10/BX

## (undated) DEVICE — STOCKINET 4INX48

## (undated) DEVICE — GAUZE SPONGE 4X4 12PLY

## (undated) DEVICE — PAD CAST SPECIALIST STRL 6

## (undated) DEVICE — PAD CAST SPECIALIST STRL 4

## (undated) DEVICE — SEE MEDLINE ITEM 146417

## (undated) DEVICE — DRAPE PLASTIC U 60X72

## (undated) DEVICE — SEE MEDLINE ITEM 157150

## (undated) DEVICE — SUT MCRYL PLUS 4-0 PS2 27IN

## (undated) DEVICE — NDL HYPO 22GX1 1/2 SYR 10ML LL

## (undated) DEVICE — GLOVE BIOGEL SKINSENSE PI 8.0

## (undated) DEVICE — GLOVE BIOGEL SKINSENSE PI 7.0

## (undated) DEVICE — FORCEP BIPOLAR

## (undated) DEVICE — PAD ABD 8X10 STERILE

## (undated) DEVICE — PENCIL ELECTROCAUTERY W/ HLSTR

## (undated) DEVICE — SLING ARM LARGE FOAM STRAP

## (undated) DEVICE — SEE MEDLINE ITEM 152529

## (undated) DEVICE — TOURNIQUET SB QC DP 18X4IN

## (undated) DEVICE — BANDAGE ESMARK 6X12

## (undated) DEVICE — DRESSING XEROFORM 1X8IN

## (undated) DEVICE — NDL MAYO CAT 1/2 CIR #6

## (undated) DEVICE — UNDERGLOVES BIOGEL PI SIZE 8.5

## (undated) DEVICE — Device

## (undated) DEVICE — SLING SHOT II LARGE

## (undated) DEVICE — SUT VICRYL 3-0 27 SH

## (undated) DEVICE — SUT 0 18IN SILK BLK BRAIDE

## (undated) DEVICE — DRESSING AQUACEL AG FOAM 4X4

## (undated) DEVICE — ADHESIVE MASTISOL VIAL 48/BX